# Patient Record
Sex: MALE | Race: WHITE | NOT HISPANIC OR LATINO | Employment: FULL TIME | URBAN - METROPOLITAN AREA
[De-identification: names, ages, dates, MRNs, and addresses within clinical notes are randomized per-mention and may not be internally consistent; named-entity substitution may affect disease eponyms.]

---

## 2023-12-05 PROBLEM — I82.409 DVT (DEEP VENOUS THROMBOSIS) (MULTI): Status: ACTIVE | Noted: 2023-12-05

## 2023-12-05 PROBLEM — E66.9 OBESITY: Status: ACTIVE | Noted: 2023-12-05

## 2023-12-05 PROBLEM — K43.0 HERNIA, INCISIONAL, WITH OBSTRUCTION: Status: ACTIVE | Noted: 2023-12-05

## 2023-12-05 PROBLEM — I26.99 PULMONARY EMBOLISM (MULTI): Status: ACTIVE | Noted: 2023-12-05

## 2023-12-05 PROBLEM — K42.0 INCARCERATED UMBILICAL HERNIA: Status: ACTIVE | Noted: 2023-12-05

## 2023-12-06 ENCOUNTER — OFFICE VISIT (OUTPATIENT)
Dept: CARDIOLOGY | Facility: CLINIC | Age: 63
End: 2023-12-06
Payer: COMMERCIAL

## 2023-12-06 VITALS
HEIGHT: 74 IN | WEIGHT: 245 LBS | SYSTOLIC BLOOD PRESSURE: 134 MMHG | DIASTOLIC BLOOD PRESSURE: 80 MMHG | BODY MASS INDEX: 31.44 KG/M2 | HEART RATE: 64 BPM | OXYGEN SATURATION: 95 %

## 2023-12-06 DIAGNOSIS — I27.82 CHRONIC SADDLE PULMONARY EMBOLISM WITHOUT ACUTE COR PULMONALE (MULTI): Primary | ICD-10-CM

## 2023-12-06 DIAGNOSIS — I26.92 CHRONIC SADDLE PULMONARY EMBOLISM WITHOUT ACUTE COR PULMONALE (MULTI): Primary | ICD-10-CM

## 2023-12-06 DIAGNOSIS — I82.532 CHRONIC DEEP VEIN THROMBOSIS (DVT) OF POPLITEAL VEIN OF LEFT LOWER EXTREMITY (MULTI): ICD-10-CM

## 2023-12-06 PROCEDURE — 99212 OFFICE O/P EST SF 10 MIN: CPT | Performed by: NURSE PRACTITIONER

## 2023-12-06 RX ORDER — APIXABAN 5 MG/1
5 TABLET, FILM COATED ORAL 2 TIMES DAILY
COMMUNITY
Start: 2023-11-13

## 2023-12-06 NOTE — PROGRESS NOTES
Jaime Johnson is a 63 y.o. male here for a follow up of a PE and DVT.     History Of Present Illness   Mr Johnson is a very pleasant 63 year old obese male who underwent a robotic assisted lab repair of hernia on November 21, 2022. On or around November 27, the patient developed severe BILATERAL leg edema, chest pain and shortness of breath. On November 20, 2022, the Patient was unable to do any form of activity without becoming very short of breath and having chest pain. He drove himself to ED and was found to have a large bilateral PE and an extensive left leg DVT. He was discharged on elqiuis one year ago.  Today, he denies any complaints of chest pain, shortness of breath or leg pain.  He does have edema to his left ankle and the top of the foot by the end of his day.  He wears his compression stockings every day.       Social HX          Family HX  No family history on file.       Review Of Systems   Constitutional: not feeling tired.   Eyes: no eyesight problems.   ENT: no hearing loss and no nosebleeds.   Cardiovascular: no intermittent leg claudication, no chest pain, no tightness or heavy pressure, no shortness of breath, no palpitations, no lower extremity edema, the heart rate was not slow, the heart rate was not fast and as noted in HPI.   Respiratory: no chronic cough and no shortness of breath.   Gastrointestinal: no change in bowel habits and no blood in stools.   Genitourinary: no urinary frequency.   Skin: no skin rashes.   Neurological: no seizures and no frequent falls.   Psychiatric: no depression and not suicidal.   All other systems have been reviewed and are negative for complaint.        Allergies  No Known Allergies       Vitals  There were no vitals taken for this visit.        Physical Exam  Constitutional: alert and in no acute distress.   Eyes: no erythema, swelling or discharge from the eye .   Neck: neck is supple, symmetric, trachea midline, no masses  and no thyromegaly .    Pulmonary: no increased work of breathing or signs of respiratory distress  and lungs clear to auscultation.  Auscultation of the lungs revealed no expiratory wheezing, normal expiratory time and no inspiratory wheezing. no rales or crackles were heard bilaterally. no rhonchi. no friction rub. no wheezing. no diminished breath sounds. no bronchial breath sounds.   Cardiovascular: carotid pulses 2+ bilaterally with no bruit  right carotid pulse 2+, no bruit heard over the right carotid,   Left carotid pulse 2+ and no bruit heard over the left carotid,   JVP was normal, no thrills ,   Regular rhythm, normal S1 and S2, no murmurs  the heart rate was normal normal S1, normal S2, no S3, no S4 no murmurs were heard.,   pedal pulses 2+ bilaterally  and no edema .   Abdomen: abdomen non-tender, no masses  and no hepatomegaly .   Skin: skin warm and dry, normal skin turgor .   Psychiatric judgment and insight is normal  and oriented to person, place and time .           Current/Home Meds  No current outpatient medications on file.       Cardiac Service Results:  5/25/23 venous duplex scan; No evidence of acute deep vein thrombus visualized in the left lower extremity. There are chronic changes visualized in the popliteal vein.     3/13/23 VENOUS DUPLEX SCAN No evidence of acute deep vein thrombus visualized in the left lower extremity. There are chronic changes visualized in the popliteal vein.     11/30/22: VENOUS DUPLEX SCAN Left Lower Venous: There is acute occlusive deep vein thrombosis visualized in the popliteal, posterior tibial and peroneal veins. There is acute non-occlusive deep vein thrombosis visualized in the distal femoral vein. Remainder of visualized vessels free of thrombus.  Additional Findings:  Evidence of Rouleaux effect (slow flow) on proximal vessels iliac and common  femoral veins - may suggest overall low pressure venous return systemically;  please correlate clinically.    ECHO  1. Left  ventricular systolic function is normal with a 60-65% estimated ejection fraction.  2. Saline contrast bubble study is negative for right to left shunting.  3. Definity study confirms normal left ventricular function.  4. Technically difficult study.  5. Pulmonary artery pressures could not be estimated. There is no significant tircuspid regurgitation.    CT SCAN 1. Large pulmonary emboli in the main pulmonary artery extendinginto  the right and left pulmonary arteries as well as in the lobar arteries  to the bilateral upper and lower lobes. There is no evidence for right  heart strain.  2. Small right pleural effusion with associated atelectasis.  3. No acute pathology in the abdomen or pelvis. There is a gallstone,  but there is no evidence for cholecystitis or biliary obstruction.  4. There is a defect in the midline anterior abdominal wall. This  appears to have been repaired with mesh. The defect contains fluid and  fat, but no loops of bowel.    Assessment/Plan      BILATERAL PE AND LEFT LEG DVT:  He has completed one year of ac.  He may stop when this current prescription runs out.  He is to continue to wear compression stockings and elevate his legs two-three times a day.  He can follow up PRN or if he has any questions, concerns or complaints.

## 2025-01-28 ENCOUNTER — APPOINTMENT (OUTPATIENT)
Dept: CARDIOLOGY | Facility: HOSPITAL | Age: 65
End: 2025-01-28
Payer: COMMERCIAL

## 2025-01-28 ENCOUNTER — HOSPITAL ENCOUNTER (INPATIENT)
Facility: HOSPITAL | Age: 65
LOS: 3 days | Discharge: HOME | End: 2025-01-31
Attending: EMERGENCY MEDICINE | Admitting: INTERNAL MEDICINE
Payer: COMMERCIAL

## 2025-01-28 ENCOUNTER — APPOINTMENT (OUTPATIENT)
Dept: RADIOLOGY | Facility: HOSPITAL | Age: 65
End: 2025-01-28
Payer: COMMERCIAL

## 2025-01-28 DIAGNOSIS — M79.89 OTHER SPECIFIED SOFT TISSUE DISORDERS: ICD-10-CM

## 2025-01-28 DIAGNOSIS — I26.92 CHRONIC SADDLE PULMONARY EMBOLISM WITHOUT ACUTE COR PULMONALE (MULTI): ICD-10-CM

## 2025-01-28 DIAGNOSIS — I26.02 ACUTE SADDLE PULMONARY EMBOLISM WITH ACUTE COR PULMONALE (MULTI): Primary | ICD-10-CM

## 2025-01-28 DIAGNOSIS — I26.99 OTHER PULMONARY EMBOLISM WITHOUT ACUTE COR PULMONALE: ICD-10-CM

## 2025-01-28 DIAGNOSIS — I27.82 CHRONIC SADDLE PULMONARY EMBOLISM WITHOUT ACUTE COR PULMONALE (MULTI): ICD-10-CM

## 2025-01-28 DIAGNOSIS — Z86.711 PERSONAL HISTORY OF PULMONARY EMBOLISM: ICD-10-CM

## 2025-01-28 LAB
ALBUMIN SERPL BCP-MCNC: 4.3 G/DL (ref 3.4–5)
ALP SERPL-CCNC: 95 U/L (ref 33–136)
ALT SERPL W P-5'-P-CCNC: 17 U/L (ref 10–52)
ANION GAP SERPL CALC-SCNC: 15 MMOL/L (ref 10–20)
APTT PPP: 29 SECONDS (ref 27–38)
AST SERPL W P-5'-P-CCNC: 17 U/L (ref 9–39)
BASOPHILS # BLD AUTO: 0.01 X10*3/UL (ref 0–0.1)
BASOPHILS NFR BLD AUTO: 0.1 %
BILIRUB SERPL-MCNC: 0.7 MG/DL (ref 0–1.2)
BNP SERPL-MCNC: 41 PG/ML (ref 0–99)
BUN SERPL-MCNC: 16 MG/DL (ref 6–23)
CALCIUM SERPL-MCNC: 9.1 MG/DL (ref 8.6–10.3)
CARDIAC TROPONIN I PNL SERPL HS: 1048 NG/L (ref 0–20)
CARDIAC TROPONIN I PNL SERPL HS: 569 NG/L (ref 0–20)
CARDIAC TROPONIN I PNL SERPL HS: 654 NG/L (ref 0–20)
CHLORIDE SERPL-SCNC: 105 MMOL/L (ref 98–107)
CO2 SERPL-SCNC: 24 MMOL/L (ref 21–32)
CREAT SERPL-MCNC: 1.15 MG/DL (ref 0.5–1.3)
EGFRCR SERPLBLD CKD-EPI 2021: 71 ML/MIN/1.73M*2
EOSINOPHIL # BLD AUTO: 0.04 X10*3/UL (ref 0–0.7)
EOSINOPHIL NFR BLD AUTO: 0.4 %
ERYTHROCYTE [DISTWIDTH] IN BLOOD BY AUTOMATED COUNT: 12.5 % (ref 11.5–14.5)
FLUAV RNA RESP QL NAA+PROBE: NOT DETECTED
FLUBV RNA RESP QL NAA+PROBE: NOT DETECTED
GLUCOSE SERPL-MCNC: 126 MG/DL (ref 74–99)
HCT VFR BLD AUTO: 49.6 % (ref 41–52)
HGB BLD-MCNC: 16.5 G/DL (ref 13.5–17.5)
IMM GRANULOCYTES # BLD AUTO: 0.04 X10*3/UL (ref 0–0.7)
IMM GRANULOCYTES NFR BLD AUTO: 0.4 % (ref 0–0.9)
INR PPP: 1.2 (ref 0.9–1.1)
LACTATE SERPL-SCNC: 1.4 MMOL/L (ref 0.4–2)
LYMPHOCYTES # BLD AUTO: 0.67 X10*3/UL (ref 1.2–4.8)
LYMPHOCYTES NFR BLD AUTO: 6.7 %
MAGNESIUM SERPL-MCNC: 1.93 MG/DL (ref 1.6–2.4)
MCH RBC QN AUTO: 31.9 PG (ref 26–34)
MCHC RBC AUTO-ENTMCNC: 33.3 G/DL (ref 32–36)
MCV RBC AUTO: 96 FL (ref 80–100)
MONOCYTES # BLD AUTO: 0.88 X10*3/UL (ref 0.1–1)
MONOCYTES NFR BLD AUTO: 8.7 %
NEUTROPHILS # BLD AUTO: 8.42 X10*3/UL (ref 1.2–7.7)
NEUTROPHILS NFR BLD AUTO: 83.7 %
NRBC BLD-RTO: 0 /100 WBCS (ref 0–0)
PLATELET # BLD AUTO: 188 X10*3/UL (ref 150–450)
POTASSIUM SERPL-SCNC: 3.7 MMOL/L (ref 3.5–5.3)
PROT SERPL-MCNC: 7.4 G/DL (ref 6.4–8.2)
PROTHROMBIN TIME: 13.3 SECONDS (ref 9.8–12.8)
RBC # BLD AUTO: 5.17 X10*6/UL (ref 4.5–5.9)
SARS-COV-2 RNA RESP QL NAA+PROBE: NOT DETECTED
SODIUM SERPL-SCNC: 140 MMOL/L (ref 136–145)
UFH PPP CHRO-ACNC: 0.4 IU/ML
UFH PPP CHRO-ACNC: 1.6 IU/ML
WBC # BLD AUTO: 10.1 X10*3/UL (ref 4.4–11.3)

## 2025-01-28 PROCEDURE — 85520 HEPARIN ASSAY: CPT | Performed by: EMERGENCY MEDICINE

## 2025-01-28 PROCEDURE — 84484 ASSAY OF TROPONIN QUANT: CPT

## 2025-01-28 PROCEDURE — 85730 THROMBOPLASTIN TIME PARTIAL: CPT | Performed by: EMERGENCY MEDICINE

## 2025-01-28 PROCEDURE — 93005 ELECTROCARDIOGRAM TRACING: CPT

## 2025-01-28 PROCEDURE — 36415 COLL VENOUS BLD VENIPUNCTURE: CPT | Performed by: EMERGENCY MEDICINE

## 2025-01-28 PROCEDURE — 85025 COMPLETE CBC W/AUTO DIFF WBC: CPT

## 2025-01-28 PROCEDURE — 71275 CT ANGIOGRAPHY CHEST: CPT

## 2025-01-28 PROCEDURE — 2020000001 HC ICU ROOM DAILY

## 2025-01-28 PROCEDURE — 85610 PROTHROMBIN TIME: CPT | Performed by: EMERGENCY MEDICINE

## 2025-01-28 PROCEDURE — 2550000001 HC RX 255 CONTRASTS: Performed by: EMERGENCY MEDICINE

## 2025-01-28 PROCEDURE — 83880 ASSAY OF NATRIURETIC PEPTIDE: CPT

## 2025-01-28 PROCEDURE — 83605 ASSAY OF LACTIC ACID: CPT

## 2025-01-28 PROCEDURE — 83735 ASSAY OF MAGNESIUM: CPT

## 2025-01-28 PROCEDURE — 99285 EMERGENCY DEPT VISIT HI MDM: CPT | Mod: 25 | Performed by: EMERGENCY MEDICINE

## 2025-01-28 PROCEDURE — 71275 CT ANGIOGRAPHY CHEST: CPT | Performed by: STUDENT IN AN ORGANIZED HEALTH CARE EDUCATION/TRAINING PROGRAM

## 2025-01-28 PROCEDURE — 2500000004 HC RX 250 GENERAL PHARMACY W/ HCPCS (ALT 636 FOR OP/ED)

## 2025-01-28 PROCEDURE — 99222 1ST HOSP IP/OBS MODERATE 55: CPT

## 2025-01-28 PROCEDURE — 2500000001 HC RX 250 WO HCPCS SELF ADMINISTERED DRUGS (ALT 637 FOR MEDICARE OP)

## 2025-01-28 PROCEDURE — 36415 COLL VENOUS BLD VENIPUNCTURE: CPT

## 2025-01-28 PROCEDURE — 85520 HEPARIN ASSAY: CPT | Performed by: INTERNAL MEDICINE

## 2025-01-28 PROCEDURE — 80053 COMPREHEN METABOLIC PANEL: CPT

## 2025-01-28 PROCEDURE — 96374 THER/PROPH/DIAG INJ IV PUSH: CPT

## 2025-01-28 PROCEDURE — 87636 SARSCOV2 & INF A&B AMP PRB: CPT

## 2025-01-28 PROCEDURE — 2500000002 HC RX 250 W HCPCS SELF ADMINISTERED DRUGS (ALT 637 FOR MEDICARE OP, ALT 636 FOR OP/ED)

## 2025-01-28 RX ORDER — FLUTICASONE PROPIONATE 50 MCG
1 SPRAY, SUSPENSION (ML) NASAL 2 TIMES DAILY
COMMUNITY

## 2025-01-28 RX ORDER — HEPARIN SODIUM 10000 [USP'U]/100ML
0-4500 INJECTION, SOLUTION INTRAVENOUS CONTINUOUS
Status: DISCONTINUED | OUTPATIENT
Start: 2025-01-28 | End: 2025-01-28

## 2025-01-28 RX ORDER — MORPHINE SULFATE 4 MG/ML
4 INJECTION, SOLUTION INTRAMUSCULAR; INTRAVENOUS ONCE
Status: DISCONTINUED | OUTPATIENT
Start: 2025-01-28 | End: 2025-01-28

## 2025-01-28 RX ORDER — GUAIFENESIN 100 MG/5ML
200 SOLUTION ORAL ONCE
Status: COMPLETED | OUTPATIENT
Start: 2025-01-28 | End: 2025-01-28

## 2025-01-28 RX ORDER — GUAIFENESIN 600 MG/1
600-1200 TABLET, EXTENDED RELEASE ORAL 2 TIMES DAILY PRN
COMMUNITY
Start: 2025-01-25 | End: 2025-01-31 | Stop reason: HOSPADM

## 2025-01-28 RX ORDER — FLUTICASONE PROPIONATE 50 MCG
1 SPRAY, SUSPENSION (ML) NASAL 2 TIMES DAILY
Status: DISCONTINUED | OUTPATIENT
Start: 2025-01-28 | End: 2025-01-31 | Stop reason: HOSPADM

## 2025-01-28 RX ORDER — GUAIFENESIN 600 MG/1
600-1200 TABLET, EXTENDED RELEASE ORAL 2 TIMES DAILY PRN
Status: CANCELLED | OUTPATIENT
Start: 2025-01-28

## 2025-01-28 RX ORDER — GUAIFENESIN 600 MG/1
600 TABLET, EXTENDED RELEASE ORAL 2 TIMES DAILY PRN
Status: DISCONTINUED | OUTPATIENT
Start: 2025-01-28 | End: 2025-01-31 | Stop reason: HOSPADM

## 2025-01-28 RX ORDER — CETIRIZINE HYDROCHLORIDE 10 MG/1
10 TABLET ORAL EVERY MORNING
COMMUNITY
End: 2025-01-31 | Stop reason: HOSPADM

## 2025-01-28 RX ORDER — ACETAMINOPHEN 325 MG/1
650 TABLET ORAL EVERY 4 HOURS PRN
Status: DISCONTINUED | OUTPATIENT
Start: 2025-01-28 | End: 2025-01-31 | Stop reason: HOSPADM

## 2025-01-28 RX ORDER — LEVALBUTEROL TARTRATE 45 UG/1
2 AEROSOL, METERED ORAL EVERY 4 HOURS PRN
COMMUNITY
Start: 2025-01-25 | End: 2025-02-24

## 2025-01-28 RX ORDER — HEPARIN SODIUM 10000 [USP'U]/100ML
0-4500 INJECTION, SOLUTION INTRAVENOUS CONTINUOUS
Status: DISCONTINUED | OUTPATIENT
Start: 2025-01-28 | End: 2025-01-30

## 2025-01-28 RX ORDER — HEPARIN SODIUM 10000 [USP'U]/100ML
0-4000 INJECTION, SOLUTION INTRAVENOUS CONTINUOUS
Status: DISCONTINUED | OUTPATIENT
Start: 2025-01-28 | End: 2025-01-28

## 2025-01-28 RX ORDER — AMOXICILLIN AND CLAVULANATE POTASSIUM 875; 125 MG/1; MG/1
1 TABLET, FILM COATED ORAL 2 TIMES DAILY
COMMUNITY
Start: 2025-01-25 | End: 2025-01-31 | Stop reason: HOSPADM

## 2025-01-28 RX ADMIN — IOHEXOL 75 ML: 350 INJECTION, SOLUTION INTRAVENOUS at 12:18

## 2025-01-28 RX ADMIN — HEPARIN SODIUM 2000 UNITS/HR: 10000 INJECTION, SOLUTION INTRAVENOUS at 12:35

## 2025-01-28 RX ADMIN — ACETAMINOPHEN 650 MG: 325 TABLET, FILM COATED ORAL at 20:38

## 2025-01-28 RX ADMIN — FLUTICASONE PROPIONATE 1 SPRAY: 50 SPRAY, METERED NASAL at 20:31

## 2025-01-28 RX ADMIN — GUAIFENESIN 600 MG: 600 TABLET, EXTENDED RELEASE ORAL at 20:38

## 2025-01-28 RX ADMIN — GUAIFENESIN 200 MG: 100 LIQUID ORAL at 12:18

## 2025-01-28 SDOH — SOCIAL STABILITY: SOCIAL INSECURITY: WITHIN THE LAST YEAR, HAVE YOU BEEN AFRAID OF YOUR PARTNER OR EX-PARTNER?: NO

## 2025-01-28 SDOH — SOCIAL STABILITY: SOCIAL INSECURITY: HAS ANYONE EVER THREATENED TO HURT YOUR FAMILY OR YOUR PETS?: NO

## 2025-01-28 SDOH — ECONOMIC STABILITY: INCOME INSECURITY: IN THE PAST 12 MONTHS HAS THE ELECTRIC, GAS, OIL, OR WATER COMPANY THREATENED TO SHUT OFF SERVICES IN YOUR HOME?: NO

## 2025-01-28 SDOH — ECONOMIC STABILITY: FOOD INSECURITY: HOW HARD IS IT FOR YOU TO PAY FOR THE VERY BASICS LIKE FOOD, HOUSING, MEDICAL CARE, AND HEATING?: NOT VERY HARD

## 2025-01-28 SDOH — SOCIAL STABILITY: SOCIAL INSECURITY: HAVE YOU HAD ANY THOUGHTS OF HARMING ANYONE ELSE?: NO

## 2025-01-28 SDOH — ECONOMIC STABILITY: HOUSING INSECURITY: AT ANY TIME IN THE PAST 12 MONTHS, WERE YOU HOMELESS OR LIVING IN A SHELTER (INCLUDING NOW)?: NO

## 2025-01-28 SDOH — ECONOMIC STABILITY: TRANSPORTATION INSECURITY: IN THE PAST 12 MONTHS, HAS LACK OF TRANSPORTATION KEPT YOU FROM MEDICAL APPOINTMENTS OR FROM GETTING MEDICATIONS?: NO

## 2025-01-28 SDOH — SOCIAL STABILITY: SOCIAL INSECURITY: WITHIN THE LAST YEAR, HAVE YOU BEEN HUMILIATED OR EMOTIONALLY ABUSED IN OTHER WAYS BY YOUR PARTNER OR EX-PARTNER?: NO

## 2025-01-28 SDOH — ECONOMIC STABILITY: HOUSING INSECURITY: IN THE PAST 12 MONTHS, HOW MANY TIMES HAVE YOU MOVED WHERE YOU WERE LIVING?: 1

## 2025-01-28 SDOH — ECONOMIC STABILITY: FOOD INSECURITY: WITHIN THE PAST 12 MONTHS, THE FOOD YOU BOUGHT JUST DIDN'T LAST AND YOU DIDN'T HAVE MONEY TO GET MORE.: NEVER TRUE

## 2025-01-28 SDOH — ECONOMIC STABILITY: HOUSING INSECURITY: IN THE LAST 12 MONTHS, WAS THERE A TIME WHEN YOU WERE NOT ABLE TO PAY THE MORTGAGE OR RENT ON TIME?: NO

## 2025-01-28 SDOH — SOCIAL STABILITY: SOCIAL INSECURITY: ABUSE: ADULT

## 2025-01-28 SDOH — SOCIAL STABILITY: SOCIAL INSECURITY
WITHIN THE LAST YEAR, HAVE YOU BEEN KICKED, HIT, SLAPPED, OR OTHERWISE PHYSICALLY HURT BY YOUR PARTNER OR EX-PARTNER?: NO

## 2025-01-28 SDOH — SOCIAL STABILITY: SOCIAL INSECURITY: ARE YOU OR HAVE YOU BEEN THREATENED OR ABUSED PHYSICALLY, EMOTIONALLY, OR SEXUALLY BY ANYONE?: NO

## 2025-01-28 SDOH — SOCIAL STABILITY: SOCIAL INSECURITY: WERE YOU ABLE TO COMPLETE ALL THE BEHAVIORAL HEALTH SCREENINGS?: YES

## 2025-01-28 SDOH — SOCIAL STABILITY: SOCIAL INSECURITY: DOES ANYONE TRY TO KEEP YOU FROM HAVING/CONTACTING OTHER FRIENDS OR DOING THINGS OUTSIDE YOUR HOME?: NO

## 2025-01-28 SDOH — SOCIAL STABILITY: SOCIAL INSECURITY: ARE THERE ANY APPARENT SIGNS OF INJURIES/BEHAVIORS THAT COULD BE RELATED TO ABUSE/NEGLECT?: NO

## 2025-01-28 SDOH — SOCIAL STABILITY: SOCIAL INSECURITY: HAVE YOU HAD THOUGHTS OF HARMING ANYONE ELSE?: NO

## 2025-01-28 SDOH — SOCIAL STABILITY: SOCIAL INSECURITY: DO YOU FEEL ANYONE HAS EXPLOITED OR TAKEN ADVANTAGE OF YOU FINANCIALLY OR OF YOUR PERSONAL PROPERTY?: NO

## 2025-01-28 SDOH — SOCIAL STABILITY: SOCIAL INSECURITY
WITHIN THE LAST YEAR, HAVE YOU BEEN RAPED OR FORCED TO HAVE ANY KIND OF SEXUAL ACTIVITY BY YOUR PARTNER OR EX-PARTNER?: NO

## 2025-01-28 SDOH — ECONOMIC STABILITY: FOOD INSECURITY: WITHIN THE PAST 12 MONTHS, YOU WORRIED THAT YOUR FOOD WOULD RUN OUT BEFORE YOU GOT THE MONEY TO BUY MORE.: NEVER TRUE

## 2025-01-28 SDOH — SOCIAL STABILITY: SOCIAL INSECURITY: DO YOU FEEL UNSAFE GOING BACK TO THE PLACE WHERE YOU ARE LIVING?: NO

## 2025-01-28 ASSESSMENT — ACTIVITIES OF DAILY LIVING (ADL)
LACK_OF_TRANSPORTATION: NO
FEEDING YOURSELF: INDEPENDENT
TOILETING: INDEPENDENT
LACK_OF_TRANSPORTATION: NO
WALKS IN HOME: INDEPENDENT
JUDGMENT_ADEQUATE_SAFELY_COMPLETE_DAILY_ACTIVITIES: YES
PATIENT'S MEMORY ADEQUATE TO SAFELY COMPLETE DAILY ACTIVITIES?: YES
HEARING - RIGHT EAR: FUNCTIONAL
BATHING: INDEPENDENT
DRESSING YOURSELF: INDEPENDENT
GROOMING: INDEPENDENT
HEARING - LEFT EAR: FUNCTIONAL
ADEQUATE_TO_COMPLETE_ADL: YES

## 2025-01-28 ASSESSMENT — COGNITIVE AND FUNCTIONAL STATUS - GENERAL
DAILY ACTIVITIY SCORE: 24
PATIENT BASELINE BEDBOUND: NO
CLIMB 3 TO 5 STEPS WITH RAILING: A LITTLE
MOBILITY SCORE: 23

## 2025-01-28 ASSESSMENT — LIFESTYLE VARIABLES
SKIP TO QUESTIONS 9-10: 1
AUDIT-C TOTAL SCORE: 0
PRESCIPTION_ABUSE_PAST_12_MONTHS: NO
HOW OFTEN DO YOU HAVE 6 OR MORE DRINKS ON ONE OCCASION: NEVER
HOW OFTEN DO YOU HAVE A DRINK CONTAINING ALCOHOL: NEVER
HOW MANY STANDARD DRINKS CONTAINING ALCOHOL DO YOU HAVE ON A TYPICAL DAY: PATIENT DOES NOT DRINK
AUDIT-C TOTAL SCORE: 0
SUBSTANCE_ABUSE_PAST_12_MONTHS: NO

## 2025-01-28 ASSESSMENT — PATIENT HEALTH QUESTIONNAIRE - PHQ9
SUM OF ALL RESPONSES TO PHQ9 QUESTIONS 1 & 2: 0
1. LITTLE INTEREST OR PLEASURE IN DOING THINGS: NOT AT ALL
2. FEELING DOWN, DEPRESSED OR HOPELESS: NOT AT ALL

## 2025-01-28 ASSESSMENT — PAIN DESCRIPTION - PAIN TYPE
TYPE: ACUTE PAIN
TYPE: ACUTE PAIN

## 2025-01-28 ASSESSMENT — PAIN DESCRIPTION - DESCRIPTORS
DESCRIPTORS: ACHING
DESCRIPTORS: OTHER (COMMENT)
DESCRIPTORS: ACHING

## 2025-01-28 ASSESSMENT — ENCOUNTER SYMPTOMS
GASTROINTESTINAL NEGATIVE: 1
NEUROLOGICAL NEGATIVE: 1
EYES NEGATIVE: 1
ACTIVITY CHANGE: 1
APPETITE CHANGE: 1
SINUS PRESSURE: 1
CHEST TIGHTNESS: 1
SHORTNESS OF BREATH: 1
MUSCULOSKELETAL NEGATIVE: 1

## 2025-01-28 ASSESSMENT — COLUMBIA-SUICIDE SEVERITY RATING SCALE - C-SSRS

## 2025-01-28 ASSESSMENT — PAIN SCALES - GENERAL
PAINLEVEL_OUTOF10: 3
PAINLEVEL_OUTOF10: 0 - NO PAIN
PAINLEVEL_OUTOF10: 8
PAINLEVEL_OUTOF10: 7

## 2025-01-28 ASSESSMENT — PAIN - FUNCTIONAL ASSESSMENT
PAIN_FUNCTIONAL_ASSESSMENT: 0-10

## 2025-01-28 ASSESSMENT — PAIN DESCRIPTION - LOCATION
LOCATION: CHEST

## 2025-01-28 ASSESSMENT — PAIN DESCRIPTION - ORIENTATION: ORIENTATION: LEFT

## 2025-01-28 NOTE — SIGNIFICANT EVENT
"PULMONARY EMBOLISM RESPONSE TEAM (PERT) NOTE    This note represents a summary of a virtual evaluation by the pulmonary embolism response team.  Suggestions and impression are summarized from the initial virtual encounter and discussion with the referring medical team. These suggestions supplement but should not be a substitute for bedside evaluation and management by the attending of record.     PERT Members on the Call:  Critical Care Attending: Dr. Christ ELI Interventional Cardiology Attending: Dr. James  Vascular Medicine Attending: Dr. Lyon    Brief Clinical Summary:  Jaime Johnson is a 64 y.o. male presenting with PE. He has a past history notable for previous DVT/PE on eliquis, which was stopped many months ago (6 months to 1 year). The PE was noted in 12/2022 along with DVT of left lower extremity, thought to be in the setting of sedentary lifestyle in the post operative period (after a hip surgery). At the time of this presentation, no significant factors that would qualify as risk factors for clot at this time.    Patient reported four days of shortness of breath, describing dyspnea and chest tightness with exertion. He did have some mild cough but infectious work up negative. He was saturating at about 90% on RA; was placed on 1-2LNC due mostly to comfort.     He underwent CT imaging for PE evaluation that showed bilateral segmental embolic burden. RV:LV ratio greater than 1. Formal TTE and DVT ultrasound pending.     Vital Signs  /81   Pulse (!) 123   Temp 36 °C (96.8 °F) (Tympanic)   Resp 20   Ht 1.88 m (6' 2\")   Wt 109 kg (240 lb)   SpO2 (!) 90%   BMI 30.81 kg/m²      Laboratory  Recent Labs     01/28/25  1110 12/15/22  1232 11/30/22  1614 11/30/22  1200   TROPHS 569*  654* 12 329* 389*   BNP 41  --   --  126*   LACTATE  --   --   --  1.4       PESI Score Al HUTCHISON. Et al. Arch Intern Med. [id number].           PESI Score:  94, consistent with JLPESIRISK: Class III, or " Intermediate risk (3.2-7.1% 30-day mortality risk) PE.    CT Scan reviewed:  Clot burden bilaterally in segmental regions   RV/LV ratio >1 by CT scan    TTE reviewed (if available):  pending    Venous duplex (if available):  pending    Contraindications to anticoagulation: none apparent  Contraindications to thrombolytics: none apparent    Initial Suggestions/Plans:  Stable enough for admission to current facility ([facility])  Agree with maintaining on medical therapy with continuation of heparin drip  Additional testing recommended: formal TTE, lactate, DVT ultrasounds  4. Though not indicated at this time, interventional options would be available at [facility] if needed in the future    To re conference the PERT team please call the  Transfer Center at [phone].

## 2025-01-28 NOTE — ED TRIAGE NOTES
Pt coming in for chest pain that has been going on for the last couple of days but got to be really severe this morning around 7 AM. States that he has been dealing with a upper respiratory infection and has been taking Augmentin. Pt is very short of breath in triage. States that after walking in from the parking lot he feels like he can't catch his breath.

## 2025-01-28 NOTE — PROGRESS NOTES
Pharmacy Medication History Review    Jaime Johnson is a 64 y.o. male admitted for No Principal Problem: There is no principal problem currently on the Problem List. Please update the Problem List and refresh.. Pharmacy reviewed the patient's [id number] medications and allergies for accuracy.    The list below reflectives the updated PTA list. Please review each medication in order reconciliation for additional clarification and justification.  Prior to Admission medications    Medication Sig Start Date End Date Taking? Authorizing Provider   amoxicillin-pot clavulanate (Augmentin) 875-125 mg tablet Take 1 tablet by mouth twice a day. 1/25/25 2/1/25 Yes Historical Provider, MD   cetirizine (ZyrTEC) 10 mg tablet Take 1 tablet (10 mg) by mouth once daily in the morning.   Yes Historical Provider, MD   fluticasone (Flonase) 50 mcg/actuation nasal spray Administer 1 spray into each nostril 2 times a day. Shake gently. Before first use, prime pump. After use, clean tip and replace cap.   Yes Historical Provider, MD   guaiFENesin (Mucinex) 600 mg 12 hr tablet Take 1-2 tablets (600-1,200 mg) by mouth 2 times a day as needed. 1/25/25 2/1/25 Yes Historical Provider, MD   levalbuterol (Xopenex) 45 mcg/actuation inhaler Inhale 2 puffs every 4 hours if needed for wheezing or shortness of breath. 1/25/25 2/24/25 Yes Historical Provider, MD   Eliquis 5 mg tablet Take 1 tablet (5 mg) by mouth 2 times a day.  Patient not taking: Reported on 1/28/2025 11/13/23  no Historical Provider, MD        The list below reflectives the updated allergy list. Please review each documented allergy for additional clarification and justification.  Allergies  Reviewed by Savi Hodgson on 1/28/2025   No Known Allergies         Below are additional concerns with the patient's PTA list.    Patient completed Eliquis one year therapy in  2023.    Savi Hodgson

## 2025-01-28 NOTE — ED PROVIDER NOTES
Emergency Department Provider Note          History of Present Illness     CC: Chest Pain and Shortness of Breath (Pt coming in for chest pain that has been going on for the last couple of days but got to be really severe this morning around 7 AM. States that he has been dealing with a upper respiratory infection and has been taking Augmentin. Pt is very short of breath in triage. States that after walking in from the parking lot he feels like he can't catch his breath. /)     History provided by: Patient  Limitations to History: None    HPI:   Jaime Johnson is a 64 y.o.male with PMH Obesity, DVT/PE on eliquis, recent pneumonia (on augmentin) presenting to the Emergency Department for chest pain and shortness of breath.  Accompanied today by his wife.  Patient reports the past 3 days now has had left-sided chest pain that he reports is sharp in nature and worse with deep inspiration.  Has had worsening shortness of breath for the past couple days as well.  Said this pain has been constant.  Worse with exertion.  Never had symptoms like this before in his life.  Does a history of DVT/PE in 2022-23 for which he was placed on Eliquis for a year.  Has been off anticoagulation for many months now.  Endorses cough, congestion, runny nose, and chills at home.  No abdominal pain, nausea, vomiting, diarrhea, or changes in urination/stool.    Records Reviewed: Recent available ED and inpatient notes reviewed in EMR.    PMHx/PSHx:  Per HPI.   - has a past medical history of Other specified health status.  - has a past surgical history that includes Small intestine surgery (10/29/2015) and Knee surgery (11/05/2015).  - has Incarcerated umbilical hernia; DVT (deep venous thrombosis) (Multi); Hernia, incisional, with obstruction; Obesity; and Pulmonary embolism on their problem list.    Medications:  Current Outpatient Medications   Medication Instructions    amoxicillin-pot clavulanate (Augmentin) 875-125 mg tablet 1 tablet,  oral, 2 times daily    cetirizine (ZYRTEC) 10 mg, Every morning    Eliquis 5 mg, 2 times daily    fluticasone (Flonase) 50 mcg/actuation nasal spray 1 spray, 2 times daily    guaiFENesin (MUCINEX) 600-1,200 mg, oral, 2 times daily PRN    levalbuterol (Xopenex) 45 mcg/actuation inhaler 2 puffs, inhalation, Every 4 hours PRN        Allergies:  Patient has no known allergies.    Social History:  - Tobacco:  has no history on file for tobacco use.   - Alcohol:  has no history on file for alcohol use.   - Illicit Drugs:  has no history on file for drug use.     ROS:  Per HPI.       Physical Exam     Triage Vitals:  T 36 °C (96.8 °F)  HR (!) 119  /89  RR 18  O2 (!) 93 % None (Room air)    General: Awake, alert, in no acute distress  Eyes: Gaze conjugate.  No scleral icterus or injection  HENT: Normo-cephalic, atraumatic. No stridor  CV: Tachycardic rate, regular rhythm. Radial pulses 2+ bilaterally  Resp: Tachypnic, increased work of breathing. Clear to auscultation.  GI: Soft, non-distended, non-tender. No rebound or guarding.  MSK/Extremities: No gross bony deformities. Moving all extremities  Skin: Warm. Appropriate color  Neuro: Alert. Oriented. Face symmetric. Speech is fluent.  Gross strength and sensation intact in b/l UE and LEs  Psych: Appropriate mood and affect          [place] Coma Scale Score: 15                    Medical Decision Making & ED Course     EKG: EKG interpreted by myself. Please see ED Course for full interpretation.    Medical Decision Making   Jaime Johnson is a 64 y.o.male presenting to the Emergency Department for shortness of breath.  On arrival, notably tachycardic to 123.  Saturating 90% on room air placed on 2 L nasal cannula.  On examination, patient appears tachypneic, short of breath.  No signs of fluid overload today.  EKG on arrival showed sinus tachycardia.  Last reported the emergency department showed a white count of 10.1, lower concern for systemic infectious process.   Hemoglobin stable at 16.5, lower concern for acute blood loss anemia.  Chemistries relatively unremarkable.  Troponin significantly elevated at 654 and downtrending at 569 consistent with heart strain.  BNP notably normal within normal limits.  CT PE showed evidence of significant clot burden concerning for saddle PE.  Point-of-care ultrasound performed that showed mild right-sided heart strain.  Discussed with the PERT team downtown who feels recommended medical management with heparin drip today and observation.  No acute thrombectomy at this time.  Will be admitted to the medical ICU for further evaluation/management.      ED Course as of 01/28/25 1403   Tue Jan 28, 2025   1212 EKG: Tachycardic to 119, regular rhythm.  DC, QRS, QTc intervals within normal limits.  No obvious ST elevations, depressions, T wave inversions.  Sinus tachycardia [AS]      ED Course User Index  [AS] Adrián Snyder MD       Independent Result Review and Interpretation: Relevant laboratory and radiographic results were reviewed and independently interpreted by myself.  As necessary, they are commented on in the ED Course.    Chronic conditions affecting the patient's care: As documented above in MDM.      Disposition   Admit    Adrián Snyder MD  Emergency Medicine PGY3      Procedures     Procedures ? Smart[employer] last updated 1/28/2025 2:03 PM        Adrián Snyder MD  Resident  01/28/25 1403

## 2025-01-28 NOTE — Clinical Note
Second ekos cath at desired location. Caps flushed and applied to device. Tagaderms applied over sutured sheath

## 2025-01-28 NOTE — H&P
ICU HPI Note          PATIENT NAME: Jaime Johnson  MRN: [id number]    SERVICE DATE:  1/28/2025  SERVICE TIME:  2:34 PM    Jaime Johnson is a 64 y.o. male on day 0 of admission presenting with Acute saddle pulmonary embolism with acute cor pulmonale (Multi).      HPI  Jaime Johnson is a 64-year-old male with past medical history of obesity, DVT/PE on Eliquis, recent sinusitis treated with Augmentin who presented for chest pain and shortness of breath.  Patient states for the past 5 days he has had left-sided chest pain.  He reports it is worsened with deep inspiration.  Patient also endorses worsening shortness of breath which is exacerbated by exertion.  Patient states he reported to [facility] clinic for sinus congestion, headache, and coughing.  He was diagnosed with sinusitis and prescribed Augmentin and an albuterol inhaler.  This morning, patient states he could not walk further than 20 feet without becoming short of breath. He also endorsed sharp chest pain at this time.  He and his wife contacted his cardiologist who recommended he present to the ED.  Patient states he had a DVT/PE in 2022 for which he was placed on Eliquis for a year.  He is not currently on any anticoagulation.    On arrival patient was afebrile, tachycardic 119, 148/89, 93% on room air.  2 L oxygen nasal cannula.  EKG showed sinus tachycardia.  CBC and CMP were unremarkable.  Labs elevated at 654, repeat 569.  CT PE showed evidence of significant clot burden concerning for saddle PE.  ED provider spoke with PERT team who recommended medical management with heparin drip and observation.  No acute thrombectomy is needed at this time.  He was started on a heparin drip and given robitussin. Patient was admitted to the ICU for further management.    Past Medical History: as listed in the HPI  Surgical History: hernia repair, knee replacement  Social History: Quit smoking 44 years  ago.    Review of Systems   Constitutional:  Positive for activity change and appetite change.   HENT:  Positive for congestion and sinus pressure.    Eyes: Negative.    Respiratory:  Positive for chest tightness and shortness of breath.    Cardiovascular:  Positive for chest pain.   Gastrointestinal: Negative.    Genitourinary: Negative.    Musculoskeletal: Negative.    Neurological: Negative.         OBJECTIVE    Vitals:    01/28/25 1115 01/28/25 1230 01/28/25 1330 01/28/25 1400   BP: 136/86 137/81 123/81 129/87   BP Location:       Patient Position:       Pulse: (!) 116 (!) 123 (!) 120 (!) 119   Resp: 20 20 20 (!) 23   Temp:       TempSrc:       SpO2: 94% (!) 90% 94% 94%   Weight:       Height:          Results from last 7 days   Lab Units 01/28/25  1110   WBC AUTO x10*3/uL 10.1   HEMOGLOBIN g/dL 16.5   HEMATOCRIT % 49.6   PLATELETS AUTO x10*3/uL 188   NEUTROS PCT AUTO % 83.7   LYMPHS PCT AUTO % 6.7   MONOS PCT AUTO % 8.7   EOS PCT AUTO % 0.4     Results from last 7 days   Lab Units 01/28/25  1110   SODIUM mmol/L 140   POTASSIUM mmol/L 3.7   CHLORIDE mmol/L 105   CO2 mmol/L 24   BUN mg/dL 16   CREATININE mg/dL 1.15   CALCIUM mg/dL 9.1   PROTEIN TOTAL g/dL 7.4   BILIRUBIN TOTAL mg/dL 0.7   ALK PHOS U/L 95   ALT U/L 17   AST U/L 17   GLUCOSE mg/dL 126*         Physical Exam  Constitutional:       Appearance: Normal appearance. He is obese.   HENT:      Mouth/Throat:      Mouth: Mucous membranes are dry.      Pharynx: Oropharynx is clear.   Eyes:      Extraocular Movements: Extraocular movements intact.   Cardiovascular:      Rate and Rhythm: Regular rhythm. Tachycardia present.   Pulmonary:      Effort: Pulmonary effort is normal.      Breath sounds: No wheezing, rhonchi or rales.   Abdominal:      General: Abdomen is flat.      Palpations: Abdomen is soft.      Tenderness: There is no abdominal tenderness. There is no guarding or rebound.   Musculoskeletal:      Right lower leg: No edema.      Left lower leg: No  edema.   Skin:     General: Skin is warm and dry.   Neurological:      General: No focal deficit present.      Mental Status: He is alert.            ASSESSMENT & PLAN  64-year-old male with past medical history of obesity, DVT/PE on Eliquis, recent sinusitis treated with Augmentin who presented for chest pain and shortness of breath.      Pt  presenting to [facility] with   Chief Complaint   Patient presents with    Chest Pain    Shortness of Breath     Pt coming in for chest pain that has been going on for the last couple of days but got to be really severe this morning around 7 AM. States that he has been dealing with a upper respiratory infection and has been taking Augmentin. Pt is very short of breath in triage. States that after walking in from the parking lot he feels like he can't catch his breath.        Neuro:  -No acute issues    CV:  #Troponinemia  #C/f right heart strain  -most likely secondary to demand  -trend troponin     Vascular:  #Acute PE  #Hx of DVT/PE  -evaluated by PERT team, recommend heparin drip and supportive treatment.   -heparin drip    Respiratory:   #Acute respiratory insufficiency secondary to PE  #Chest pain  -2L NC, wean as tolerated  -tylenol PRN    GI:  -No acute issues  -Regular diet    Endo:  -No acute issues    Renal:  -No acute issues    Hematological:  -No acute issues    ID:  #Sinusitis  -flonase  -mucinex PRN    Vent/O2:   2L NC  Lines/Devices: PIV  Drips: --  ATBx: --  Fluids: --  Diet: Regular  PPX: --  DVT PPX: heparin drip  Code status: Full code  Dispo: ICU    Tina Angulo DO PGY-1 Internal Medicine  January 28, 2025 2:34 PM

## 2025-01-29 ENCOUNTER — APPOINTMENT (OUTPATIENT)
Dept: RADIOLOGY | Facility: HOSPITAL | Age: 65
End: 2025-01-29
Payer: COMMERCIAL

## 2025-01-29 ENCOUNTER — APPOINTMENT (OUTPATIENT)
Dept: CARDIOLOGY | Facility: HOSPITAL | Age: 65
End: 2025-01-29
Payer: COMMERCIAL

## 2025-01-29 ENCOUNTER — APPOINTMENT (OUTPATIENT)
Dept: VASCULAR MEDICINE | Facility: HOSPITAL | Age: 65
End: 2025-01-29
Payer: COMMERCIAL

## 2025-01-29 LAB
ANION GAP SERPL CALC-SCNC: 15 MMOL/L (ref 10–20)
ANION GAP SERPL CALC-SCNC: 16 MMOL/L (ref 10–20)
APTT PPP: 37 SECONDS (ref 27–38)
APTT PPP: 56 SECONDS (ref 27–38)
ATRIAL RATE: 119 BPM
BUN SERPL-MCNC: 18 MG/DL (ref 6–23)
BUN SERPL-MCNC: 21 MG/DL (ref 6–23)
CALCIUM SERPL-MCNC: 8.7 MG/DL (ref 8.6–10.3)
CALCIUM SERPL-MCNC: 8.9 MG/DL (ref 8.6–10.3)
CARDIAC TROPONIN I PNL SERPL HS: 277 NG/L (ref 0–20)
CHLORIDE SERPL-SCNC: 105 MMOL/L (ref 98–107)
CHLORIDE SERPL-SCNC: 107 MMOL/L (ref 98–107)
CO2 SERPL-SCNC: 21 MMOL/L (ref 21–32)
CO2 SERPL-SCNC: 23 MMOL/L (ref 21–32)
CREAT SERPL-MCNC: 1.04 MG/DL (ref 0.5–1.3)
CREAT SERPL-MCNC: 1.07 MG/DL (ref 0.5–1.3)
EGFRCR SERPLBLD CKD-EPI 2021: 77 ML/MIN/1.73M*2
EGFRCR SERPLBLD CKD-EPI 2021: 80 ML/MIN/1.73M*2
EJECTION FRACTION APICAL 4 CHAMBER: 66.9
EJECTION FRACTION: 68 %
ERYTHROCYTE [DISTWIDTH] IN BLOOD BY AUTOMATED COUNT: 12.8 % (ref 11.5–14.5)
ERYTHROCYTE [DISTWIDTH] IN BLOOD BY AUTOMATED COUNT: 12.9 % (ref 11.5–14.5)
ERYTHROCYTE [DISTWIDTH] IN BLOOD BY AUTOMATED COUNT: 13 % (ref 11.5–14.5)
FIBRINOGEN PPP-MCNC: 581 MG/DL (ref 200–400)
FIBRINOGEN PPP-MCNC: 581 MG/DL (ref 200–400)
GLUCOSE SERPL-MCNC: 112 MG/DL (ref 74–99)
GLUCOSE SERPL-MCNC: 129 MG/DL (ref 74–99)
HCT VFR BLD AUTO: 46.9 % (ref 41–52)
HCT VFR BLD AUTO: 47.8 % (ref 41–52)
HCT VFR BLD AUTO: 48.8 % (ref 41–52)
HGB BLD-MCNC: 16 G/DL (ref 13.5–17.5)
HGB BLD-MCNC: 16.1 G/DL (ref 13.5–17.5)
HGB BLD-MCNC: 16.1 G/DL (ref 13.5–17.5)
INR PPP: 1.2 (ref 0.9–1.1)
INR PPP: 1.3 (ref 0.9–1.1)
MCH RBC QN AUTO: 31.6 PG (ref 26–34)
MCH RBC QN AUTO: 32.1 PG (ref 26–34)
MCH RBC QN AUTO: 32.2 PG (ref 26–34)
MCHC RBC AUTO-ENTMCNC: 33 G/DL (ref 32–36)
MCHC RBC AUTO-ENTMCNC: 33.7 G/DL (ref 32–36)
MCHC RBC AUTO-ENTMCNC: 34.1 G/DL (ref 32–36)
MCV RBC AUTO: 94 FL (ref 80–100)
MCV RBC AUTO: 95 FL (ref 80–100)
MCV RBC AUTO: 96 FL (ref 80–100)
NRBC BLD-RTO: 0 /100 WBCS (ref 0–0)
P AXIS: 59 DEGREES
P OFFSET: 218 MS
P ONSET: 151 MS
PLATELET # BLD AUTO: 165 X10*3/UL (ref 150–450)
PLATELET # BLD AUTO: 173 X10*3/UL (ref 150–450)
PLATELET # BLD AUTO: 184 X10*3/UL (ref 150–450)
POTASSIUM SERPL-SCNC: 3.7 MMOL/L (ref 3.5–5.3)
POTASSIUM SERPL-SCNC: 3.7 MMOL/L (ref 3.5–5.3)
PR INTERVAL: 140 MS
PROTHROMBIN TIME: 13.9 SECONDS (ref 9.8–12.8)
PROTHROMBIN TIME: 14.1 SECONDS (ref 9.8–12.8)
Q ONSET: 221 MS
QRS COUNT: 20 BEATS
QRS DURATION: 82 MS
QT INTERVAL: 312 MS
QTC CALCULATION(BAZETT): 438 MS
QTC FREDERICIA: 392 MS
R AXIS: 44 DEGREES
RBC # BLD AUTO: 4.97 X10*6/UL (ref 4.5–5.9)
RBC # BLD AUTO: 5.02 X10*6/UL (ref 4.5–5.9)
RBC # BLD AUTO: 5.09 X10*6/UL (ref 4.5–5.9)
SODIUM SERPL-SCNC: 139 MMOL/L (ref 136–145)
SODIUM SERPL-SCNC: 140 MMOL/L (ref 136–145)
T AXIS: 78 DEGREES
T OFFSET: 377 MS
UFH PPP CHRO-ACNC: 0.5 IU/ML
UFH PPP CHRO-ACNC: 0.7 IU/ML
VENTRICULAR RATE: 119 BPM
WBC # BLD AUTO: 10.4 X10*3/UL (ref 4.4–11.3)
WBC # BLD AUTO: 10.7 X10*3/UL (ref 4.4–11.3)
WBC # BLD AUTO: 9.8 X10*3/UL (ref 4.4–11.3)

## 2025-01-29 PROCEDURE — 37799 UNLISTED PX VASCULAR SURGERY: CPT

## 2025-01-29 PROCEDURE — 76937 US GUIDE VASCULAR ACCESS: CPT

## 2025-01-29 PROCEDURE — 2500000001 HC RX 250 WO HCPCS SELF ADMINISTERED DRUGS (ALT 637 FOR MEDICARE OP)

## 2025-01-29 PROCEDURE — 36415 COLL VENOUS BLD VENIPUNCTURE: CPT

## 2025-01-29 PROCEDURE — 85027 COMPLETE CBC AUTOMATED: CPT | Performed by: RADIOLOGY

## 2025-01-29 PROCEDURE — 77001 FLUOROGUIDE FOR VEIN DEVICE: CPT

## 2025-01-29 PROCEDURE — 02FP3Z0 FRAGMENTATION OF PULMONARY TRUNK, PERCUTANEOUS APPROACH, ULTRASONIC: ICD-10-PCS | Performed by: RADIOLOGY

## 2025-01-29 PROCEDURE — 2500000001 HC RX 250 WO HCPCS SELF ADMINISTERED DRUGS (ALT 637 FOR MEDICARE OP): Performed by: INTERNAL MEDICINE

## 2025-01-29 PROCEDURE — 85384 FIBRINOGEN ACTIVITY: CPT | Performed by: RADIOLOGY

## 2025-01-29 PROCEDURE — 99291 CRITICAL CARE FIRST HOUR: CPT

## 2025-01-29 PROCEDURE — 2500000004 HC RX 250 GENERAL PHARMACY W/ HCPCS (ALT 636 FOR OP/ED)

## 2025-01-29 PROCEDURE — 37799 UNLISTED PX VASCULAR SURGERY: CPT | Performed by: RADIOLOGY

## 2025-01-29 PROCEDURE — C8924 2D TTE W OR W/O FOL W/CON,FU: HCPCS

## 2025-01-29 PROCEDURE — 80048 BASIC METABOLIC PNL TOTAL CA: CPT

## 2025-01-29 PROCEDURE — 3E08317 INTRODUCTION OF OTHER THROMBOLYTIC INTO HEART, PERCUTANEOUS APPROACH: ICD-10-PCS | Performed by: RADIOLOGY

## 2025-01-29 PROCEDURE — 61623 EVASC TEMP BALO ARTL OCC H/N: CPT

## 2025-01-29 PROCEDURE — 85520 HEPARIN ASSAY: CPT | Performed by: INTERNAL MEDICINE

## 2025-01-29 PROCEDURE — 93308 TTE F-UP OR LMTD: CPT | Performed by: STUDENT IN AN ORGANIZED HEALTH CARE EDUCATION/TRAINING PROGRAM

## 2025-01-29 PROCEDURE — 80048 BASIC METABOLIC PNL TOTAL CA: CPT | Performed by: RADIOLOGY

## 2025-01-29 PROCEDURE — 84484 ASSAY OF TROPONIN QUANT: CPT

## 2025-01-29 PROCEDURE — 36415 COLL VENOUS BLD VENIPUNCTURE: CPT | Performed by: INTERNAL MEDICINE

## 2025-01-29 PROCEDURE — 2720000007 HC OR 272 NO HCPCS

## 2025-01-29 PROCEDURE — C1769 GUIDE WIRE: HCPCS

## 2025-01-29 PROCEDURE — 36216 PLACE CATHETER IN ARTERY: CPT

## 2025-01-29 PROCEDURE — 85610 PROTHROMBIN TIME: CPT | Performed by: RADIOLOGY

## 2025-01-29 PROCEDURE — 2020000001 HC ICU ROOM DAILY

## 2025-01-29 PROCEDURE — 2500000004 HC RX 250 GENERAL PHARMACY W/ HCPCS (ALT 636 FOR OP/ED): Performed by: RADIOLOGY

## 2025-01-29 PROCEDURE — 85027 COMPLETE CBC AUTOMATED: CPT

## 2025-01-29 PROCEDURE — C1757 CATH, THROMBECTOMY/EMBOLECT: HCPCS

## 2025-01-29 RX ORDER — SODIUM CHLORIDE 9 MG/ML
30 INJECTION, SOLUTION INTRAVENOUS CONTINUOUS
Status: ACTIVE | OUTPATIENT
Start: 2025-01-29 | End: 2025-01-30

## 2025-01-29 RX ORDER — ACETAMINOPHEN 500 MG
5 TABLET ORAL NIGHTLY PRN
Status: DISCONTINUED | OUTPATIENT
Start: 2025-01-29 | End: 2025-01-31 | Stop reason: HOSPADM

## 2025-01-29 RX ORDER — HEPARIN SODIUM 10000 [USP'U]/100ML
600 INJECTION, SOLUTION INTRAVENOUS CONTINUOUS
Status: DISCONTINUED | OUTPATIENT
Start: 2025-01-29 | End: 2025-01-30

## 2025-01-29 RX ORDER — SIMETHICONE 80 MG
40 TABLET,CHEWABLE ORAL 4 TIMES DAILY PRN
Status: DISCONTINUED | OUTPATIENT
Start: 2025-01-29 | End: 2025-01-31 | Stop reason: HOSPADM

## 2025-01-29 RX ORDER — LIDOCAINE HYDROCHLORIDE 10 MG/ML
INJECTION, SOLUTION EPIDURAL; INFILTRATION; INTRACAUDAL; PERINEURAL
Status: COMPLETED | OUTPATIENT
Start: 2025-01-29 | End: 2025-01-29

## 2025-01-29 RX ADMIN — SIMETHICONE 40 MG: 80 TABLET, CHEWABLE ORAL at 05:17

## 2025-01-29 RX ADMIN — ACETAMINOPHEN 650 MG: 325 TABLET, FILM COATED ORAL at 20:54

## 2025-01-29 RX ADMIN — SODIUM CHLORIDE 30 ML/HR: 9 INJECTION, SOLUTION INTRAVENOUS at 09:56

## 2025-01-29 RX ADMIN — HEPARIN SODIUM 1700 UNITS/HR: 10000 INJECTION, SOLUTION INTRAVENOUS at 02:37

## 2025-01-29 RX ADMIN — ALTEPLASE 1 MG/HR: 2.2 INJECTION, POWDER, LYOPHILIZED, FOR SOLUTION INTRAVENOUS at 09:57

## 2025-01-29 RX ADMIN — FLUTICASONE PROPIONATE 1 SPRAY: 50 SPRAY, METERED NASAL at 08:20

## 2025-01-29 RX ADMIN — FLUTICASONE PROPIONATE 1 SPRAY: 50 SPRAY, METERED NASAL at 21:00

## 2025-01-29 RX ADMIN — PERFLUTREN 3 ML OF DILUTION: 6.52 INJECTION, SUSPENSION INTRAVENOUS at 12:54

## 2025-01-29 RX ADMIN — HEPARIN SODIUM 600 UNITS/HR: 10000 INJECTION, SOLUTION INTRAVENOUS at 09:50

## 2025-01-29 RX ADMIN — HEPARIN SODIUM 1700 UNITS/HR: 10000 INJECTION, SOLUTION INTRAVENOUS at 18:10

## 2025-01-29 RX ADMIN — GUAIFENESIN 600 MG: 600 TABLET, EXTENDED RELEASE ORAL at 20:54

## 2025-01-29 RX ADMIN — SODIUM CHLORIDE 30 ML/HR: 9 INJECTION, SOLUTION INTRAVENOUS at 09:53

## 2025-01-29 RX ADMIN — Medication 5 MG: at 02:27

## 2025-01-29 RX ADMIN — ALTEPLASE 1 MG/HR: 2.2 INJECTION, POWDER, LYOPHILIZED, FOR SOLUTION INTRAVENOUS at 09:49

## 2025-01-29 RX ADMIN — GUAIFENESIN 600 MG: 600 TABLET, EXTENDED RELEASE ORAL at 08:20

## 2025-01-29 RX ADMIN — LIDOCAINE HYDROCHLORIDE 5 ML: 10 INJECTION, SOLUTION EPIDURAL; INFILTRATION; INTRACAUDAL; PERINEURAL at 09:19

## 2025-01-29 ASSESSMENT — PAIN SCALES - GENERAL
PAINLEVEL_OUTOF10: 0 - NO PAIN
PAINLEVEL_OUTOF10: 0 - NO PAIN
PAINLEVEL_OUTOF10: 5 - MODERATE PAIN
PAINLEVEL_OUTOF10: 0 - NO PAIN

## 2025-01-29 ASSESSMENT — PAIN - FUNCTIONAL ASSESSMENT
PAIN_FUNCTIONAL_ASSESSMENT: 0-10

## 2025-01-29 ASSESSMENT — PAIN DESCRIPTION - LOCATION: LOCATION: ABDOMEN

## 2025-01-29 NOTE — CARE PLAN
Problem: Pain - Adult  Goal: Verbalizes/displays adequate comfort level or baseline comfort level  Outcome: Progressing     Problem: Safety - Adult  Goal: Free from fall injury  Outcome: Progressing     Problem: Discharge Planning  Goal: Discharge to home or other facility with appropriate resources  Outcome: Progressing     Problem: Chronic Conditions and Co-morbidities  Goal: Patient's chronic conditions and co-morbidity symptoms are monitored and maintained or improved  Outcome: Progressing     Problem: Nutrition  Goal: Nutrient intake appropriate for maintaining nutritional needs  Outcome: Progressing     Problem: Respiratory  Goal: Minimal/no exertional discomfort or dyspnea this shift  Outcome: Progressing  Goal: Patent airway maintained this shift  Outcome: Progressing  Goal: Verbalize decreased shortness of breath this shift  Outcome: Progressing  Goal: Wean oxygen to maintain O2 saturation per order/standard this shift  Outcome: Progressing     Problem: Fall/Injury  Goal: Not fall by end of shift  Outcome: Progressing  Goal: Be free from injury by end of the shift  Outcome: Progressing  Goal: Verbalize understanding of personal risk factors for fall in the hospital  Outcome: Progressing  Goal: Verbalize understanding of risk factor reduction measures to prevent injury from fall in the home  Outcome: Progressing  Goal: Pace activities to prevent fatigue by end of the shift  Outcome: Progressing   The patient's goals for the shift include      The clinical goals for the shift include pt to remain hemodynamically stable. decrease work of breathing

## 2025-01-29 NOTE — PROGRESS NOTES
Subjective   Jaime Johnson is a 64 y.o. male who presents with Chest Pain and Shortness of Breath (Pt coming in for chest pain that has been going on for the last couple of days but got to be really severe this morning around 7 AM. States that he has been dealing with a upper respiratory infection and has been taking Augmentin. Pt is very short of breath in triage. States that after walking in from the parking lot he feels like he can't catch his breath. /).    Pt seen and examined at bedside, post EKOS placement, stable, wife at bedside, all questions answered.    Objective   Vitals:    01/29/25 1100   BP: 115/62   Pulse: 88   Resp: 21   Temp:    SpO2: 94%      Physical Exam  Physical Exam:  Constitutional: well developed, awake, alert, no acute distress  ENMT: mucous membranes moist, EOMI, conjunctivae clear  Head/Neck: normocephalic, atraumatic; supple, trachea midline  Respiratory/Thorax: patent airways, CTAB; no wheezes, rales, or rhonchi  Cardiovascular: RRR, no murmur  Gastrointestinal: soft, nondistended, non-tender, bowel sounds appreciated  Extremities: palpable peripheral pulses, no edema or cyanosis  Neurological: AO x3, no focal deficits  Psychological: appropriate mood and behavior  Skin: warm and dry    Assessment/Plan       Neuro:  - Patient orientated to person place and time  - Monitor for ICU delirium  - Patient requires no sedation or analgesia  - Maintain sleep hygiene    Cardiovascular  #Right heart strain in setting of submassive PE  #Elevated troponin's  -TTE planned for today  -Maintain MAPs>65     Pulmonary:  #Acute saddle pulmonary embolism with acute cor pulmonale  #History of DVT/PE  -EKOS placed  -2 L nasal cannula    GI:  No acute issues    Renal:   No acute issues    Endocrine:  No acute issues    Heme/Onc:  No acute issues    ID:  No acute issues  -Culture Data:    Skin/MSK:  No acute acute issues    ICU CHECK LIST:   Antimicrobials: None  Oxygen: 2L NC  Feeding: Regular  Fluids:    Analgesia:   Sedation:   Thromboprophylaxis: Heparin  Ulcer prophylaxis:   Glycemic control:   Bowel care:   Indwelling catheters: right femoral introducer  Lines: PIV    Code Status: Full Code    Kevin Butler DO PGY-2  Internal Medicine

## 2025-01-29 NOTE — CARE PLAN
The patient's goals for the shift include      Problem: Pain - Adult  Goal: Verbalizes/displays adequate comfort level or baseline comfort level  Outcome: Progressing     Problem: Safety - Adult  Goal: Free from fall injury  Outcome: Progressing     Problem: Discharge Planning  Goal: Discharge to home or other facility with appropriate resources  Outcome: Progressing     Problem: Chronic Conditions and Co-morbidities  Goal: Patient's chronic conditions and co-morbidity symptoms are monitored and maintained or improved  Outcome: Progressing     Problem: Nutrition  Goal: Nutrient intake appropriate for maintaining nutritional needs  Outcome: Progressing     Problem: Respiratory  Goal: Minimal/no exertional discomfort or dyspnea this shift  Outcome: Progressing  Goal: Patent airway maintained this shift  Outcome: Progressing  Goal: Verbalize decreased shortness of breath this shift  Outcome: Progressing  Goal: Wean oxygen to maintain O2 saturation per order/standard this shift  Outcome: Progressing     The clinical goals for the shift include Decrease work of breathing

## 2025-01-29 NOTE — PROGRESS NOTES
01/29/25 1226   Discharge Planning   Living Arrangements Spouse/significant other   Support Systems Spouse/significant other   Assistance Needed independent   Type of Residence Private residence  (ranch style home)   Number of Stairs to Enter Residence 3   Home or Post Acute Services None   Expected Discharge Disposition Home   Does the patient need discharge transport arranged? No   Intensity of Service   Intensity of Service 0-30 min     Care transitions at bedside to complete assessment.  TCC introduced self and explained role to patient and wife. Patient demographics reviewed and sent.  Patient stated that he is independent with adls at home, stated that he does work and drives.  Patient stated that Leah will be driving him home from the hospital.  Patient denies homegoing needs at this time.  Care transitions to follow.    PCP: Jaime Vazquez  Insurance: [employer]  Pharmacy: [facility] on [address]

## 2025-01-29 NOTE — PROCEDURES
Interventional Radiology Brief Postprocedure Note    Attending: Glen House MD    Assistant:   Staff Role   Joana Farris MT Radiology Technologist   Alfonso Alatorre, RN Radiology Nurse   Sha Pederson MT Radiology Technologist   Glen House MD Radiologist       Diagnosis:   1. Acute saddle pulmonary embolism with acute cor pulmonale (Multi)        2. Chronic saddle pulmonary embolism without acute cor pulmonale (Multi)  Transthoracic Echo (TTE) Complete    Vascular US lower extremity venous duplex bilateral    Transthoracic Echo (TTE) Complete    Vascular US lower extremity venous duplex bilateral          Description of procedure: IR angiogram bilateral pulmonary artery ekos catheter placement and Tpa initiation.     Timeout:  Yes    Procedure Area: Procedure Area     Anesthesia:   Local/Topical    Complications: None    Estimated Blood Loss: minimal    Medications (Filter: Administrations occurring from 1033 to 1033 on 01/29/25) As of 01/29/25 1033      None          No specimens collected      See detailed result report with images in PACS.    The patient tolerated the procedure well without incident or complication and is in stable condition.

## 2025-01-29 NOTE — CONSULTS
IR removed pulmonary thrombolysis catheters at bedside. Patient resumed on high intensity heparin protocol. Patient subjectively feels better, no tackycardia, normal po2 2 L.     Luis A Houes MD

## 2025-01-30 ENCOUNTER — APPOINTMENT (OUTPATIENT)
Dept: VASCULAR MEDICINE | Facility: HOSPITAL | Age: 65
End: 2025-01-30
Payer: COMMERCIAL

## 2025-01-30 ENCOUNTER — APPOINTMENT (OUTPATIENT)
Dept: CARDIOLOGY | Facility: HOSPITAL | Age: 65
End: 2025-01-30
Payer: COMMERCIAL

## 2025-01-30 LAB
ANION GAP SERPL CALC-SCNC: 12 MMOL/L (ref 10–20)
ANION GAP SERPL CALC-SCNC: 14 MMOL/L (ref 10–20)
APTT PPP: 34 SECONDS (ref 27–38)
APTT PPP: 87 SECONDS (ref 27–38)
APTT PPP: 94 SECONDS (ref 27–38)
APTT PPP: 94 SECONDS (ref 27–38)
BUN SERPL-MCNC: 21 MG/DL (ref 6–23)
BUN SERPL-MCNC: 22 MG/DL (ref 6–23)
CALCIUM SERPL-MCNC: 8.3 MG/DL (ref 8.6–10.3)
CALCIUM SERPL-MCNC: 8.4 MG/DL (ref 8.6–10.3)
CHLORIDE SERPL-SCNC: 105 MMOL/L (ref 98–107)
CHLORIDE SERPL-SCNC: 106 MMOL/L (ref 98–107)
CO2 SERPL-SCNC: 23 MMOL/L (ref 21–32)
CO2 SERPL-SCNC: 23 MMOL/L (ref 21–32)
CREAT SERPL-MCNC: 0.95 MG/DL (ref 0.5–1.3)
CREAT SERPL-MCNC: 1.09 MG/DL (ref 0.5–1.3)
EGFRCR SERPLBLD CKD-EPI 2021: 76 ML/MIN/1.73M*2
EGFRCR SERPLBLD CKD-EPI 2021: 89 ML/MIN/1.73M*2
EJECTION FRACTION: 63 %
ERYTHROCYTE [DISTWIDTH] IN BLOOD BY AUTOMATED COUNT: 12.6 % (ref 11.5–14.5)
ERYTHROCYTE [DISTWIDTH] IN BLOOD BY AUTOMATED COUNT: 12.7 % (ref 11.5–14.5)
ERYTHROCYTE [DISTWIDTH] IN BLOOD BY AUTOMATED COUNT: 12.7 % (ref 11.5–14.5)
ERYTHROCYTE [DISTWIDTH] IN BLOOD BY AUTOMATED COUNT: 13 % (ref 11.5–14.5)
FIBRINOGEN PPP-MCNC: 501 MG/DL (ref 200–400)
FIBRINOGEN PPP-MCNC: 525 MG/DL (ref 200–400)
FIBRINOGEN PPP-MCNC: 566 MG/DL (ref 200–400)
FIBRINOGEN PPP-MCNC: 566 MG/DL (ref 200–400)
GLUCOSE SERPL-MCNC: 105 MG/DL (ref 74–99)
GLUCOSE SERPL-MCNC: 108 MG/DL (ref 74–99)
HCT VFR BLD AUTO: 43.8 % (ref 41–52)
HCT VFR BLD AUTO: 45.3 % (ref 41–52)
HCT VFR BLD AUTO: 46 % (ref 41–52)
HCT VFR BLD AUTO: 48.2 % (ref 41–52)
HGB BLD-MCNC: 14.6 G/DL (ref 13.5–17.5)
HGB BLD-MCNC: 15.1 G/DL (ref 13.5–17.5)
HGB BLD-MCNC: 15.3 G/DL (ref 13.5–17.5)
HGB BLD-MCNC: 16.3 G/DL (ref 13.5–17.5)
INR PPP: 1.2 (ref 0.9–1.1)
INR PPP: 1.2 (ref 0.9–1.1)
INR PPP: 1.3 (ref 0.9–1.1)
INR PPP: 1.5 (ref 0.9–1.1)
MCH RBC QN AUTO: 31.9 PG (ref 26–34)
MCH RBC QN AUTO: 31.9 PG (ref 26–34)
MCH RBC QN AUTO: 32 PG (ref 26–34)
MCH RBC QN AUTO: 32.3 PG (ref 26–34)
MCHC RBC AUTO-ENTMCNC: 32.8 G/DL (ref 32–36)
MCHC RBC AUTO-ENTMCNC: 33.3 G/DL (ref 32–36)
MCHC RBC AUTO-ENTMCNC: 33.8 G/DL (ref 32–36)
MCHC RBC AUTO-ENTMCNC: 33.8 G/DL (ref 32–36)
MCV RBC AUTO: 94 FL (ref 80–100)
MCV RBC AUTO: 95 FL (ref 80–100)
MCV RBC AUTO: 96 FL (ref 80–100)
MCV RBC AUTO: 97 FL (ref 80–100)
NRBC BLD-RTO: 0 /100 WBCS (ref 0–0)
PLATELET # BLD AUTO: 149 X10*3/UL (ref 150–450)
PLATELET # BLD AUTO: 173 X10*3/UL (ref 150–450)
PLATELET # BLD AUTO: 178 X10*3/UL (ref 150–450)
PLATELET # BLD AUTO: 187 X10*3/UL (ref 150–450)
POTASSIUM SERPL-SCNC: 3.4 MMOL/L (ref 3.5–5.3)
POTASSIUM SERPL-SCNC: 3.5 MMOL/L (ref 3.5–5.3)
PROTHROMBIN TIME: 13.4 SECONDS (ref 9.8–12.8)
PROTHROMBIN TIME: 14 SECONDS (ref 9.8–12.8)
PROTHROMBIN TIME: 14.1 SECONDS (ref 9.8–12.8)
PROTHROMBIN TIME: 17 SECONDS (ref 9.8–12.8)
RBC # BLD AUTO: 4.56 X10*6/UL (ref 4.5–5.9)
RBC # BLD AUTO: 4.74 X10*6/UL (ref 4.5–5.9)
RBC # BLD AUTO: 4.8 X10*6/UL (ref 4.5–5.9)
RBC # BLD AUTO: 5.05 X10*6/UL (ref 4.5–5.9)
RIGHT VENTRICLE FREE WALL PEAK S': 14.8 CM/S
SODIUM SERPL-SCNC: 137 MMOL/L (ref 136–145)
SODIUM SERPL-SCNC: 139 MMOL/L (ref 136–145)
TRICUSPID ANNULAR PLANE SYSTOLIC EXCURSION: 2.3 CM
UFH PPP CHRO-ACNC: 0.6 IU/ML
UFH PPP CHRO-ACNC: 0.6 IU/ML
WBC # BLD AUTO: 11.3 X10*3/UL (ref 4.4–11.3)
WBC # BLD AUTO: 9 X10*3/UL (ref 4.4–11.3)
WBC # BLD AUTO: 9.2 X10*3/UL (ref 4.4–11.3)
WBC # BLD AUTO: 9.3 X10*3/UL (ref 4.4–11.3)

## 2025-01-30 PROCEDURE — 97165 OT EVAL LOW COMPLEX 30 MIN: CPT | Mod: GO

## 2025-01-30 PROCEDURE — 2500000002 HC RX 250 W HCPCS SELF ADMINISTERED DRUGS (ALT 637 FOR MEDICARE OP, ALT 636 FOR OP/ED)

## 2025-01-30 PROCEDURE — 80048 BASIC METABOLIC PNL TOTAL CA: CPT

## 2025-01-30 PROCEDURE — 97161 PT EVAL LOW COMPLEX 20 MIN: CPT | Mod: GP

## 2025-01-30 PROCEDURE — 85384 FIBRINOGEN ACTIVITY: CPT | Performed by: RADIOLOGY

## 2025-01-30 PROCEDURE — 99233 SBSQ HOSP IP/OBS HIGH 50: CPT

## 2025-01-30 PROCEDURE — 99233 SBSQ HOSP IP/OBS HIGH 50: CPT | Performed by: INTERNAL MEDICINE

## 2025-01-30 PROCEDURE — 85730 THROMBOPLASTIN TIME PARTIAL: CPT | Performed by: RADIOLOGY

## 2025-01-30 PROCEDURE — 85027 COMPLETE CBC AUTOMATED: CPT | Performed by: RADIOLOGY

## 2025-01-30 PROCEDURE — 93321 DOPPLER ECHO F-UP/LMTD STD: CPT | Performed by: STUDENT IN AN ORGANIZED HEALTH CARE EDUCATION/TRAINING PROGRAM

## 2025-01-30 PROCEDURE — 93970 EXTREMITY STUDY: CPT

## 2025-01-30 PROCEDURE — 85520 HEPARIN ASSAY: CPT

## 2025-01-30 PROCEDURE — 2500000001 HC RX 250 WO HCPCS SELF ADMINISTERED DRUGS (ALT 637 FOR MEDICARE OP)

## 2025-01-30 PROCEDURE — 93970 EXTREMITY STUDY: CPT | Performed by: SURGERY

## 2025-01-30 PROCEDURE — 2020000001 HC ICU ROOM DAILY

## 2025-01-30 PROCEDURE — 93308 TTE F-UP OR LMTD: CPT | Performed by: STUDENT IN AN ORGANIZED HEALTH CARE EDUCATION/TRAINING PROGRAM

## 2025-01-30 PROCEDURE — 85027 COMPLETE CBC AUTOMATED: CPT

## 2025-01-30 PROCEDURE — 93308 TTE F-UP OR LMTD: CPT

## 2025-01-30 PROCEDURE — 2500000004 HC RX 250 GENERAL PHARMACY W/ HCPCS (ALT 636 FOR OP/ED)

## 2025-01-30 PROCEDURE — 36415 COLL VENOUS BLD VENIPUNCTURE: CPT

## 2025-01-30 PROCEDURE — 85610 PROTHROMBIN TIME: CPT | Performed by: RADIOLOGY

## 2025-01-30 PROCEDURE — 80048 BASIC METABOLIC PNL TOTAL CA: CPT | Performed by: RADIOLOGY

## 2025-01-30 RX ORDER — POTASSIUM CHLORIDE 20 MEQ/1
40 TABLET, EXTENDED RELEASE ORAL ONCE
Status: COMPLETED | OUTPATIENT
Start: 2025-01-30 | End: 2025-01-30

## 2025-01-30 RX ORDER — DIPHENOXYLATE HYDROCHLORIDE AND ATROPINE SULFATE 2.5; .025 MG/1; MG/1
1 TABLET ORAL 4 TIMES DAILY PRN
Status: DISCONTINUED | OUTPATIENT
Start: 2025-01-30 | End: 2025-01-31 | Stop reason: HOSPADM

## 2025-01-30 RX ADMIN — ACETAMINOPHEN 650 MG: 325 TABLET, FILM COATED ORAL at 07:46

## 2025-01-30 RX ADMIN — APIXABAN 10 MG: 5 TABLET, FILM COATED ORAL at 21:02

## 2025-01-30 RX ADMIN — FLUTICASONE PROPIONATE 1 SPRAY: 50 SPRAY, METERED NASAL at 21:03

## 2025-01-30 RX ADMIN — GUAIFENESIN 600 MG: 600 TABLET, EXTENDED RELEASE ORAL at 21:11

## 2025-01-30 RX ADMIN — ACETAMINOPHEN 650 MG: 325 TABLET, FILM COATED ORAL at 21:11

## 2025-01-30 RX ADMIN — GUAIFENESIN 600 MG: 600 TABLET, EXTENDED RELEASE ORAL at 07:46

## 2025-01-30 RX ADMIN — FLUTICASONE PROPIONATE 1 SPRAY: 50 SPRAY, METERED NASAL at 07:47

## 2025-01-30 RX ADMIN — APIXABAN 10 MG: 5 TABLET, FILM COATED ORAL at 14:27

## 2025-01-30 RX ADMIN — POTASSIUM CHLORIDE 40 MEQ: 1500 TABLET, EXTENDED RELEASE ORAL at 10:20

## 2025-01-30 RX ADMIN — DIPHENOXYLATE HYDROCHLORIDE AND ATROPINE SULFATE 1 TABLET: 2.5; .025 TABLET ORAL at 15:46

## 2025-01-30 ASSESSMENT — PAIN SCALES - GENERAL
PAINLEVEL_OUTOF10: 0 - NO PAIN
PAINLEVEL_OUTOF10: 2

## 2025-01-30 ASSESSMENT — COGNITIVE AND FUNCTIONAL STATUS - GENERAL
DAILY ACTIVITIY SCORE: 24
CLIMB 3 TO 5 STEPS WITH RAILING: A LITTLE
MOBILITY SCORE: 23

## 2025-01-30 ASSESSMENT — PAIN - FUNCTIONAL ASSESSMENT
PAIN_FUNCTIONAL_ASSESSMENT: 0-10

## 2025-01-30 ASSESSMENT — PAIN DESCRIPTION - LOCATION: LOCATION: HEAD

## 2025-01-30 ASSESSMENT — PAIN DESCRIPTION - ORIENTATION: ORIENTATION: MID

## 2025-01-30 NOTE — CARE PLAN
Problem: Pain - Adult  Goal: Verbalizes/displays adequate comfort level or baseline comfort level  Outcome: Progressing     Problem: Safety - Adult  Goal: Free from fall injury  Outcome: Progressing     Problem: Discharge Planning  Goal: Discharge to home or other facility with appropriate resources  Outcome: Progressing     Problem: Chronic Conditions and Co-morbidities  Goal: Patient's chronic conditions and co-morbidity symptoms are monitored and maintained or improved  Outcome: Progressing     Problem: Nutrition  Goal: Nutrient intake appropriate for maintaining nutritional needs  Outcome: Progressing     Problem: Respiratory  Goal: Minimal/no exertional discomfort or dyspnea this shift  Outcome: Progressing  Goal: Patent airway maintained this shift  Outcome: Progressing  Goal: Verbalize decreased shortness of breath this shift  Outcome: Progressing  Goal: Wean oxygen to maintain O2 saturation per order/standard this shift  Outcome: Progressing     Problem: Fall/Injury  Goal: Not fall by end of shift  Outcome: Progressing  Goal: Be free from injury by end of the shift  Outcome: Progressing  Goal: Verbalize understanding of personal risk factors for fall in the hospital  Outcome: Progressing  Goal: Verbalize understanding of risk factor reduction measures to prevent injury from fall in the home  Outcome: Progressing  Goal: Pace activities to prevent fatigue by end of the shift  Outcome: Progressing   The patient's goals for the shift include      The clinical goals for the shift include pt to remain hemodynamically stable

## 2025-01-30 NOTE — PROGRESS NOTES
Occupational Therapy    Evaluation    Patient Name: Jaime Johnson  MRN: [id number]  Department: [facility]  Room: [facility]  Today's Date: 1/30/2025  Time Calculation  Start Time: 1148  Stop Time: 1204  Time Calculation (min): 16 min        Assessment:  End of Session Communication: Bedside nurse  End of Session Patient Position: Bed, 2 rail up, Alarm off, not on at start of session     Plan:  No Skilled OT: No acute OT goals identified  OT Frequency: OT eval only  OT Discharge Recommendations: No OT needed after discharge  OT - OK to Discharge: Yes (to next level of care when cleared by medical team)       Subjective   Current Problem:  1. Acute saddle pulmonary embolism with acute cor pulmonale (Multi)  Transthoracic Echo Limited    Follow Up In Cardiology      2. Chronic saddle pulmonary embolism without acute cor pulmonale (Multi)  Transthoracic Echo (TTE) Complete    Vascular US lower extremity venous duplex bilateral    Transthoracic Echo (TTE) Complete    Vascular US lower extremity venous duplex bilateral    Upper extremity venous duplex bilateral    Upper extremity venous duplex bilateral      3. Other pulmonary embolism without acute cor pulmonale  Transthoracic Echo (TTE) Complete    Vascular US lower extremity venous duplex bilateral    Transthoracic Echo (TTE) Limited    Transthoracic Echo (TTE) Limited      4. Other specified soft tissue disorders  Upper extremity venous duplex bilateral      5. Personal history of pulmonary embolism  Transthoracic Echo (TTE) Limited        General:  General  Reason for Referral: impaired adl; pt. admitted with sob, (+) pe, pt. underwent EKOS and TPA  Past Medical History Relevant to Rehab: Obesity, DVT/PE on eliquis, recent pneumonia (on augmentin)  Family/Caregiver Present: Yes  Caregiver Feedback: spouse  Co-Treatment: PT  Co-Treatment Reason: possible need for co tx  Prior to Session Communication: Bedside nurse  Patient Position Received: Bed, 2 rail up, Alarm off,  not on at start of session  General Comment: pt. agreeable to therapy intervention  Precautions:  Precautions Comment: heparin, piv, tele, VSS     Date/Time Vitals Session Patient Position Pulse Resp SpO2 BP MAP (mmHg)    01/30/25 1500 --  --  76  20  91 %  119/79  95                 Pain:  Pain Assessment  Pain Assessment:  (no pain complaints)    Objective   Cognition:  Overall Cognitive Status: Within Functional Limits           Home Living:  Home Living Comments: pt. lives with spouse, 1 floor home, 3 step entry with rail, basement laundry, wh. walker, tub/shower with [facility], st. toilet  Prior Function:  Prior Function Comments: pt. independent with adl/ambulation without device, works as an , drives, shares iadl  IADL History:     ADL:  ADL Comments: pt. dons/dofs socks with sba seated eob flexing forward with some difficulty, provided pt. education re:  use of LH a/e, dme for adl and where to access, spouse and patient verbalize understanding  Activity Tolerance:  Early Mobility/Exercise Safety Screen: Proceed with mobilization - No exclusion criteria met  Bed Mobility/Transfers: Bed Mobility  Bed Mobility:  (independent supine <> sit)    Transfers  Transfer:  (sit<> stand from eob;  independent)      Functional Mobility:  Functional Mobility  Functional Mobility Performed:  (supervision without device, cues for ec/ws and deep breathing)     Strength:  Strength Comments: escobar's observed wfl    Outcome Measures:[facility] Daily Activity  Putting on and taking off regular lower body clothing: None  Bathing (including washing, rinsing, drying): None  Putting on and taking off regular upper body clothing: None  Toileting, which includes using toilet, bedpan or urinal: None  Taking care of personal grooming such as brushing teeth: None  Eating Meals: None  Daily Activity - Total Score: 24         and Early Mobility/Exercise Safety Screen: Proceed with mobilization - No exclusion criteria met  ICU Mobility Scale:  Walking with assistance of 1 person [8]         Education:  pacing, energy conservation, LH a/e and dme

## 2025-01-30 NOTE — PROGRESS NOTES
Physical Therapy    Physical Therapy Evaluation    Patient Name: Jaime Johnson  MRN: [id number]  Today's Date: 1/30/2025   Time Calculation  Start Time: 1149  Stop Time: 1204  Time Calculation (min): 15 min  180/180-A    Assessment/Plan   PT Assessment  End of Session Communication: Bedside nurse  End of Session Patient Position: Bed, 2 rail up, Alarm off, not on at start of session (All needs in reach and no complaints noted)  IP OR SWING BED PT PLAN  Inpatient or Swing Bed: Inpatient  PT Plan  PT Plan: PT Eval only  PT Eval Only Reason: Safe to return home  PT Frequency: PT eval only  PT Discharge Recommendations: No PT needed after discharge  PT - OK to Discharge: Yes    Subjective     Current Problem:  1. Acute saddle pulmonary embolism with acute cor pulmonale (Multi)  Transthoracic Echo Limited    Follow Up In Cardiology      2. Chronic saddle pulmonary embolism without acute cor pulmonale (Multi)  Transthoracic Echo (TTE) Complete    Vascular US lower extremity venous duplex bilateral    Transthoracic Echo (TTE) Complete    Vascular US lower extremity venous duplex bilateral    Upper extremity venous duplex bilateral    Upper extremity venous duplex bilateral      3. Other pulmonary embolism without acute cor pulmonale  Transthoracic Echo (TTE) Complete    Vascular US lower extremity venous duplex bilateral    Transthoracic Echo (TTE) Limited    Transthoracic Echo (TTE) Limited      4. Other specified soft tissue disorders  Upper extremity venous duplex bilateral      5. Personal history of pulmonary embolism  Transthoracic Echo (TTE) Limited        Patient Active Problem List   Diagnosis    Incarcerated umbilical hernia    DVT (deep venous thrombosis) (Multi)    Hernia, incisional, with obstruction    Obesity    Pulmonary embolism    Acute saddle pulmonary embolism with acute cor pulmonale (Multi)     General Visit Information:  General  Reason for Referral: PT Eval and Treat  Referred By: Chasidy Joseph,  MD  Past Medical History Relevant to Rehab: 64-year-old male with past medical history of obesity, DVT/PE on Eliquis, recent sinusitis treated with Augmentin who presented for chest pain and shortness of breath.  Patient states for the past 5 days he has had left-sided chest pain.  He reports it is worsened with deep inspiration.  Patient also endorses worsening shortness of breath which is exacerbated by exertion.  Patient states he reported to [facility] clinic for sinus congestion, headache, and coughing.  He was diagnosed with sinusitis and prescribed Augmentin and an albuterol inhaler.  This morning, patient states he could not walk further than 20 feet without becoming short of breath. He also endorsed sharp chest pain at this time.  He and his wife contacted his cardiologist who recommended he present to the ED.  Co-Treatment: OT  Co-Treatment Reason: maximize safety and functional mobility  Prior to Session Communication: Bedside nurse  Patient Position Received: Bed, 2 rail up, Alarm off, not on at start of session (Agreeable to PT, wife present)    Home Living:  Home Living  Home Living Comments: pt. lives with spouse, 1 floor home, 3 step entry with rail, basement laundry, wh. walker, tub/shower with [facility], st. toilet    Prior Level of Function:  Prior Function Per Pt/Caregiver Report  Prior Function Comments: pt. independent with adl/ambulation without device, works as an , drives, shares iadl    Precautions:  Precautions  Precautions Comment: Fall precautions, heparin, piv, tele    Vital Signs:  Vital Signs  Vitals Session:  (VSS)    Objective     Pain:  Pain Assessment  Pain Assessment:  (0/10)    Cognition:  Cognition  Overall Cognitive Status: Within Functional Limits    General Assessments:  Activity Tolerance  Early Mobility/Exercise Safety Screen: Proceed with mobilization - No exclusion criteria met  Sensation  Light Touch: No apparent deficits  Strength  Strength Comments: B LE ROM and  strength WFL  Dynamic Standing Balance  Dynamic Standing-Comments: Good without an AD    Functional Assessments:  Bed Mobility  Bed Mobility:  (supine <> sitting: independent)  Transfers  Transfer:  (STS from EOB: independent)  Ambulation/Gait Training  Ambulation/Gait Training Performed:  (Pt amb 100' without an AD with SUP demonstrating good balance with slight BHAKTA. Educated in energy conservation throughout session.)  Stairs  Stairs:  (Pt navigated 10 stairs with HR and CGA, continued to educate pt in energy conservation, however demonstrating good balance and safety awareness.)    Outcome Measures:  [facility] Basic Mobility  Turning from your back to your side while in a flat bed without using bedrails: None  Moving from lying on your back to sitting on the side of a flat bed without using bedrails: None  Moving to and from bed to chair (including a wheelchair): None  Standing up from a chair using your arms (e.g. wheelchair or bedside chair): None  To walk in hospital room: None  Climbing 3-5 steps with railing: A little  Basic Mobility - Total Score: 23     FSS-ICU  Ambulation: Walks >/ or equal to 150 feet independently  Rolling: Complete independence  Sitting: Complete independence  Transfer Sit-to-Stand: Complete independence  Transfer Supine-to-Sit: Complete independence  Total Score: 35  Early Mobility/Exercise Safety Screen: Proceed with mobilization - No exclusion criteria met  ICU Mobility Scale: Walking independently without a gait aid [10]  E = Exercise and Early Mobility  Early Mobility/Exercise Safety Screen: Proceed with mobilization - No exclusion criteria met  ICU Mobility Scale: Walking independently without a gait aid    Education Documentation  No documentation found.  Education Comments  No comments found.

## 2025-01-30 NOTE — PROGRESS NOTES
"Subjective   Jaime Johnson is a 64 y.o. male who presents with Chest Pain and Shortness of Breath (Pt coming in for chest pain that has been going on for the last couple of days but got to be really severe this morning around 7 AM. States that he has been dealing with a upper respiratory infection and has been taking Augmentin. Pt is very short of breath in triage. States that after walking in from the parking lot he feels like he can't catch his breath. /).    Pt seen and examined at bedside, stated that \"It feels easier to breathe today.\" No new complaints.    Objective   Vitals:    01/30/25 1000   BP: 119/70   Pulse: 71   Resp: 15   Temp:    SpO2: 93%      Physical Exam  Physical Exam:  Constitutional: well developed, awake, alert, no acute distress  ENMT: mucous membranes moist, EOMI, conjunctivae clear  Head/Neck: normocephalic, atraumatic; supple, trachea midline  Respiratory/Thorax: patent airways, CTAB; no wheezes, rales, or rhonchi  Cardiovascular: RRR, no murmur  Gastrointestinal: soft, nondistended, non-tender, bowel sounds appreciated  Extremities: palpable peripheral pulses, no edema or cyanosis  Neurological: AO x3, no focal deficits  Psychological: appropriate mood and behavior  Skin: warm and dry    Assessment/Plan       Neuro:  - Patient orientated to person place and time  - Monitor for ICU delirium  - Patient requires no sedation or analgesia  - Maintain sleep hygiene    Cardiovascular  #Right heart strain in setting of submassive PE  #Elevated troponin's  -Repeat Limited TTE today due to difficulty evaluating Right heart function  -Maintain MAPs>65    Vascular  #Provoked RLE DVT  -Pt on heparin drip, planing to transition to DOAC  -Vascular on consult     Pulmonary:  #Acute saddle pulmonary embolism with acute cor pulmonale  #History of DVT/PE  -S/P EKOS, on heparin drip with plan to transition to DOAC  -No supplemental O2 required    GI:  No acute issues    Renal:   No acute " issues    Endocrine:  No acute issues    Heme/Onc:  No acute issues    ID:  No acute issues  -Culture Data:    Skin/MSK:  No acute acute issues    ICU CHECK LIST:   Antimicrobials: None  Oxygen: ORA  Feeding: Regular  Fluids:   Analgesia:   Sedation:   Thromboprophylaxis: Heparin  Ulcer prophylaxis:   Glycemic control:   Bowel care:   Indwelling catheters: None  Lines: PIV    Code Status: Full Code    Kevin Butler DO PGY-2  Internal Medicine

## 2025-01-30 NOTE — PROGRESS NOTES
Subjective   Subjective Data and Overnight Events:  Improved SOB s/p EKOS yesterday with IR.    Objective   Vital Signs:  Patient Vitals for the past 24 hrs:   BP Temp Temp src Pulse Resp SpO2   01/30/25 1200 -- -- -- 92 23 94 %   01/30/25 1100 124/79 -- -- 74 23 95 %   01/30/25 1000 119/70 -- -- 71 15 93 %   01/30/25 0900 120/71 -- -- 75 21 91 %   01/30/25 0800 123/77 -- -- 82 21 94 %   01/30/25 0705 114/80 -- -- 72 23 93 %   01/30/25 0700 -- -- -- 73 21 91 %   01/30/25 0600 96/67 -- -- 64 16 93 %   01/30/25 0500 108/62 -- -- 64 14 96 %   01/30/25 0400 117/66 36.3 °C (97.3 °F) Temporal 63 15 96 %   01/30/25 0300 103/71 -- -- 76 21 95 %   01/30/25 0200 108/68 -- -- 68 16 94 %   01/30/25 0100 105/64 -- -- 66 19 91 %   01/30/25 0000 111/72 -- -- 73 22 92 %   01/29/25 2300 122/75 36.2 °C (97.2 °F) Temporal 75 22 92 %   01/29/25 2200 117/71 -- -- 80 24 92 %   01/29/25 2100 133/84 -- -- 88 (!) 30 95 %   01/29/25 2000 125/72 36.2 °C (97.2 °F) Temporal 83 22 95 %   01/29/25 1900 128/74 -- -- 80 22 97 %   01/29/25 1800 110/75 -- -- 85 23 94 %   01/29/25 1700 116/75 -- -- 86 24 93 %   01/29/25 1600 117/71 36.2 °C (97.2 °F) Temporal 87 23 93 %   01/29/25 1500 119/74 -- -- 88 24 94 %   01/29/25 1400 115/70 -- -- 86 25 93 %   01/29/25 1330 111/68 -- -- 86 24 93 %   01/29/25 1300 116/83 -- -- 87 26 93 %   01/29/25 1230 121/70 -- -- 90 (!) 28 94 %       Physical Exam:  General: no acute distress  Neurologic: Alert and oriented x3.    Current Medications:  Scheduled medications   Medication Dose Route Frequency    fluticasone  1 spray Each Nostril BID    perflutren protein A microsphere  0.5 mL intravenous Once in imaging    sulfur hexafluoride microsphr  2 mL intravenous Once in imaging     Continuous Medications   Medication Dose Last Rate    heparin  0-4,500 Units/hr 1,700 Units/hr (01/29/25 1810)    heparin  600 Units/hr Stopped (01/29/25 1809)     PRN medications   Medication    acetaminophen    guaiFENesin    heparin     melatonin    simethicone       Pertinent Recent Cardiovascular Studies (personally reviewed):  Vascular studies:  That makes  Venous duplex 1/30/2025: R popliteal DVT; upper extremities negative for DVT    Cardiac studies:  Echocardiogram 1/29/25:  Limited due to  artifact EKOS catheter, RV appears generally normal in function, perhaps slightly enlarged in size.  LVEF is angelica  at this    Laboratory values:  CMP:  Recent Labs     01/30/25  0808 01/30/25  0518 01/29/25 2035 01/29/25  0527 01/28/25  1110 11/30/22  1200    139 139 140 140 138   K 3.4* 3.5 3.7 3.7 3.7 4.3    106 105 107 105 101   CO2 23 23 23 21 24 25   ANIONGAP 12 14 15 16 15 16   BUN 22 21 21 18 16 18   CREATININE 1.09 0.95 1.04 1.07 1.15 1.04   EGFR 76 89 80 77 71  --    MG  --   --   --   --  1.93 2.07     Recent Labs     01/28/25  1110 11/30/22  1200   ALBUMIN 4.3 3.9   ALKPHOS 95 86   ALT 17 44   AST 17 30   BILITOT 0.7 1.1     CBC:  Recent Labs     01/30/25  0518 01/30/25  0024 01/29/25 2035 01/29/25  1427 01/29/25  0527 01/28/25  1110 12/01/22  0310 11/30/22  1200   WBC 9.0 11.3 10.4 10.7 9.8 10.1 12.0* 16.2*   HGB 14.6 16.3 16.1 16.0 16.1 16.5 13.1* 14.8   HCT 43.8 48.2 47.8 46.9 48.8 49.6 40.4* 45.5   * 187 165 173 184 188 183 199   MCV 96 95 95 94 96 96 97 97     COAG:   Recent Labs     01/30/25  0518 01/30/25  0024 01/29/25 2035 01/29/25  1427 01/29/25  0527 01/29/25  0201 01/28/25  2031 01/28/25  1725 01/28/25  1213 12/02/22  0519 12/01/22  0553 11/30/22  1843 11/30/22  1200 11/17/22  0739   INR 1.3* 1.2* 1.3* 1.2*  --   --   --   --  1.2*  --   --   --  1.3* 1.0   HAUF 0.6 0.6  --   --  0.7 0.5 0.4 1.6*  --  0.4 0.5   < >  --   --    FIBRINOGEN 501* 566* 581* 581*  --   --   --   --   --   --   --   --   --   --     < > = values in this interval not displayed.     CARDIAC:   Recent Labs     01/29/25  1153 01/28/25  1725 01/28/25  1110 12/15/22  1232 11/30/22  1614 11/30/22  1200   TROPHS 277* 1,048* 569*  654* 12  329* 389*   BNP  --   --  41  --   --  126*          I have personally reviewed most recent PCP, cardiology, vascular, and/or podiatry documentation.      Assessment/Plan   64 y.o. male with  PE/DVT - 2nd episode  in the setting of sinusitis.    Patient was discussed on PERT, patient had ongoing signs of poor compensation yesterday s/p EKOS by IR Dr. House. Doing well today.    Plan:  Agree with transition to DOAC w loading dose  Can follow up with me in EVLS 1 month with limited echo for RV  2nd episode - indefinitely DOAC as plan       Zach Lopez MD, FACC, FSCAI, RPVI  Co-Director, Vascular Center, and  Co-Director, Pulmonary Embolism Response Team,   [facility] [place]                                 of Medicine,                                                                 [facility] [employer]

## 2025-01-30 NOTE — PROGRESS NOTES
MD reached out to the TCC regarding patient going home on eliquis and concern about cost for medication.  Eliquis savings card provided to patient at bedside by this TCC.  Patient appreciative.  Per MD, plan for discharge is tomorrow.  Care transitions to continue to follow.

## 2025-01-31 VITALS
WEIGHT: 239 LBS | BODY MASS INDEX: 30.67 KG/M2 | RESPIRATION RATE: 21 BRPM | HEART RATE: 81 BPM | DIASTOLIC BLOOD PRESSURE: 75 MMHG | HEIGHT: 74 IN | SYSTOLIC BLOOD PRESSURE: 122 MMHG | TEMPERATURE: 97 F | OXYGEN SATURATION: 92 %

## 2025-01-31 LAB
ANION GAP SERPL CALC-SCNC: 11 MMOL/L (ref 10–20)
APTT PPP: 34 SECONDS (ref 27–38)
APTT PPP: 35 SECONDS (ref 27–38)
BUN SERPL-MCNC: 22 MG/DL (ref 6–23)
CALCIUM SERPL-MCNC: 8.5 MG/DL (ref 8.6–10.3)
CHLORIDE SERPL-SCNC: 107 MMOL/L (ref 98–107)
CO2 SERPL-SCNC: 24 MMOL/L (ref 21–32)
CREAT SERPL-MCNC: 1.08 MG/DL (ref 0.5–1.3)
EGFRCR SERPLBLD CKD-EPI 2021: 77 ML/MIN/1.73M*2
ERYTHROCYTE [DISTWIDTH] IN BLOOD BY AUTOMATED COUNT: 12.6 % (ref 11.5–14.5)
ERYTHROCYTE [DISTWIDTH] IN BLOOD BY AUTOMATED COUNT: 12.7 % (ref 11.5–14.5)
FIBRINOGEN PPP-MCNC: 513 MG/DL (ref 200–400)
FIBRINOGEN PPP-MCNC: 538 MG/DL (ref 200–400)
GLUCOSE SERPL-MCNC: 106 MG/DL (ref 74–99)
HCT VFR BLD AUTO: 42.7 % (ref 41–52)
HCT VFR BLD AUTO: 43.7 % (ref 41–52)
HGB BLD-MCNC: 14.2 G/DL (ref 13.5–17.5)
HGB BLD-MCNC: 14.5 G/DL (ref 13.5–17.5)
INR PPP: 1.6 (ref 0.9–1.1)
INR PPP: 1.7 (ref 0.9–1.1)
MCH RBC QN AUTO: 31.7 PG (ref 26–34)
MCH RBC QN AUTO: 31.8 PG (ref 26–34)
MCHC RBC AUTO-ENTMCNC: 33.2 G/DL (ref 32–36)
MCHC RBC AUTO-ENTMCNC: 33.3 G/DL (ref 32–36)
MCV RBC AUTO: 95 FL (ref 80–100)
MCV RBC AUTO: 96 FL (ref 80–100)
NRBC BLD-RTO: 0 /100 WBCS (ref 0–0)
NRBC BLD-RTO: 0 /100 WBCS (ref 0–0)
PLATELET # BLD AUTO: 157 X10*3/UL (ref 150–450)
PLATELET # BLD AUTO: 168 X10*3/UL (ref 150–450)
POTASSIUM SERPL-SCNC: 4.1 MMOL/L (ref 3.5–5.3)
PROTHROMBIN TIME: 18.3 SECONDS (ref 9.8–12.8)
PROTHROMBIN TIME: 19.2 SECONDS (ref 9.8–12.8)
RBC # BLD AUTO: 4.46 X10*6/UL (ref 4.5–5.9)
RBC # BLD AUTO: 4.58 X10*6/UL (ref 4.5–5.9)
SODIUM SERPL-SCNC: 138 MMOL/L (ref 136–145)
WBC # BLD AUTO: 10.4 X10*3/UL (ref 4.4–11.3)
WBC # BLD AUTO: 8.7 X10*3/UL (ref 4.4–11.3)

## 2025-01-31 PROCEDURE — 99239 HOSP IP/OBS DSCHRG MGMT >30: CPT

## 2025-01-31 PROCEDURE — 2500000001 HC RX 250 WO HCPCS SELF ADMINISTERED DRUGS (ALT 637 FOR MEDICARE OP)

## 2025-01-31 PROCEDURE — 36415 COLL VENOUS BLD VENIPUNCTURE: CPT | Performed by: RADIOLOGY

## 2025-01-31 PROCEDURE — 85384 FIBRINOGEN ACTIVITY: CPT | Performed by: RADIOLOGY

## 2025-01-31 PROCEDURE — 85730 THROMBOPLASTIN TIME PARTIAL: CPT | Performed by: RADIOLOGY

## 2025-01-31 PROCEDURE — 80048 BASIC METABOLIC PNL TOTAL CA: CPT

## 2025-01-31 PROCEDURE — 2500000004 HC RX 250 GENERAL PHARMACY W/ HCPCS (ALT 636 FOR OP/ED)

## 2025-01-31 PROCEDURE — 85027 COMPLETE CBC AUTOMATED: CPT | Performed by: RADIOLOGY

## 2025-01-31 PROCEDURE — 2500000001 HC RX 250 WO HCPCS SELF ADMINISTERED DRUGS (ALT 637 FOR MEDICARE OP): Performed by: INTERNAL MEDICINE

## 2025-01-31 RX ADMIN — SIMETHICONE 40 MG: 80 TABLET, CHEWABLE ORAL at 08:26

## 2025-01-31 RX ADMIN — FLUTICASONE PROPIONATE 1 SPRAY: 50 SPRAY, METERED NASAL at 08:24

## 2025-01-31 RX ADMIN — GUAIFENESIN 600 MG: 600 TABLET, EXTENDED RELEASE ORAL at 08:24

## 2025-01-31 RX ADMIN — APIXABAN 10 MG: 5 TABLET, FILM COATED ORAL at 08:24

## 2025-01-31 RX ADMIN — ACETAMINOPHEN 650 MG: 325 TABLET, FILM COATED ORAL at 08:24

## 2025-01-31 ASSESSMENT — PAIN SCALES - GENERAL
PAINLEVEL_OUTOF10: 0 - NO PAIN
PAINLEVEL_OUTOF10: 0 - NO PAIN

## 2025-01-31 ASSESSMENT — PAIN - FUNCTIONAL ASSESSMENT
PAIN_FUNCTIONAL_ASSESSMENT: 0-10

## 2025-01-31 NOTE — CARE PLAN
Problem: Pain - Adult  Goal: Verbalizes/displays adequate comfort level or baseline comfort level  Outcome: Met     Problem: Safety - Adult  Goal: Free from fall injury  Outcome: Met     Problem: Discharge Planning  Goal: Discharge to home or other facility with appropriate resources  Outcome: Met     Problem: Chronic Conditions and Co-morbidities  Goal: Patient's chronic conditions and co-morbidity symptoms are monitored and maintained or improved  Outcome: Met     Problem: Nutrition  Goal: Nutrient intake appropriate for maintaining nutritional needs  Outcome: Met     Problem: Respiratory  Goal: Minimal/no exertional discomfort or dyspnea this shift  Outcome: Met  Goal: Patent airway maintained this shift  Outcome: Met  Goal: Verbalize decreased shortness of breath this shift  Outcome: Met  Goal: Wean oxygen to maintain O2 saturation per order/standard this shift  Outcome: Met     Problem: Fall/Injury  Goal: Not fall by end of shift  Outcome: Met  Goal: Be free from injury by end of the shift  Outcome: Met  Goal: Verbalize understanding of personal risk factors for fall in the hospital  Outcome: Met  Goal: Verbalize understanding of risk factor reduction measures to prevent injury from fall in the home  Outcome: Met  Goal: Pace activities to prevent fatigue by end of the shift  Outcome: Met   The patient's goals for the shift include      The clinical goals for the shift include patient to remain hemodynamically stable    Pt is discharged

## 2025-01-31 NOTE — DISCHARGE INSTRUCTIONS
You are being discharged on Eliquis.  You will take 10 mg twice a day for the next 6 days, then on you will take 5 mg twice a day indefinitely.  I recommend following up with your primary care physician within 1 week and vascular medicine within 1 month.  I have included the contact information to schedule an appointment with vascular medicine in your discharge paperwork.  Thank you for allowing me to participate in your care.

## 2025-01-31 NOTE — DISCHARGE SUMMARY
Discharge diagnosis  Acute saddle pulmonary embolism with acute cor pulmonale (Multi)    Follow up regarding    Hospital course  Jaime Johnson is a 64-year-old male with past medical history of obesity, DVT/PE on Eliquis, recent sinusitis treated with Augmentin who presented for chest pain and shortness of breath.  Workup in the emergency department revealed significant clot burden concerning for saddle PE.  PERT team was activated and recommended medical management with heparin drip.  There was no acute thrombectomy indicated.  Patient was admitted to the ICU for observation.  Vascular surgery was consulted.  He was initiated on a DOAC.  DVT ultrasound revealed a DVT in his right lower extremity.  Patient was weaned off of oxygen requirements and discharged home with vascular follow-up.  He was also recommended follow-up with his PCP.  Patient is aware that he will need hypercoagulability workup in the future and will be on anticoagulation indefinitely as this is his second PE.    Vitals:    01/31/25 0900   BP: 122/75   Pulse: 81   Resp: 21   Temp:    SpO2: 92%      Physical exam   GENERAL: Awake/ alert, cooperative.   HEAD:  Normocephalic, atraumatic.  EYES:  Round and reactive.  ENT:  No nasal discharge, mucous membranes moist and pink.  NECK:  Atraumatic, no meningismus.  CARDIOVASCULAR:  Regular rate and rhythm, no murmur.  RESPIRATORY:  CTAB, no active work of breathing.   ABDOMEN:  Soft, no tenderness, nondistended.  EXTREMITIES:  No peripheral edema, no calf tenderness.  SKIN:  Warm and dry.  NEUROLOGICAL:  Nonfocal grossly.     Home-going medications     Medication List      START taking these medications     apixaban 5 mg tablet; Commonly known as: Eliquis; Take 2 tablets (10 mg)   by mouth 2 times a day for 6 days, THEN 1 tablet (5 mg) 2 times a day.;   Start taking on: January 31, 2025     CONTINUE taking these medications     fluticasone 50 mcg/actuation nasal spray; Commonly known as: Flonase    levalbuterol 45 mcg/actuation inhaler; Commonly known as: Xopenex     STOP taking these medications     amoxicillin-pot clavulanate 875-125 mg tablet; Commonly known as:   Augmentin   guaiFENesin 600 mg 12 hr tablet; Commonly known as: Mucinex   ZyrTEC 10 mg tablet; Generic drug: cetirizine       Outpatient follow-up instructions and medication changes  Future Appointments   Date Time Provider Department Center   2/27/2025  1:00 PM PAR MAC 3 ECHO ROOM 1 [id number] MAC 3300   2/27/2025  2:30 PM Zach Lopez MD [id number] [place]       Kevin Butler DO, PGY-2  Internal Medicine

## 2025-02-02 LAB
ATRIAL RATE: 123 BPM
P AXIS: 70 DEGREES
PR INTERVAL: 142 MS
Q ONSET: 249 MS
QRS COUNT: 20 BEATS
QRS DURATION: 91 MS
QT INTERVAL: 312 MS
QTC CALCULATION(BAZETT): 445 MS
QTC FREDERICIA: 395 MS
R AXIS: 54 DEGREES
T AXIS: 62 DEGREES
T OFFSET: 405 MS
VENTRICULAR RATE: 122 BPM

## 2025-02-06 PROBLEM — E66.811 CLASS 1 OBESITY WITH BODY MASS INDEX (BMI) OF 30.0 TO 30.9 IN ADULT: Status: ACTIVE | Noted: 2023-12-05

## 2025-02-25 LAB
ATRIAL RATE: 119 BPM
P AXIS: 59 DEGREES
P OFFSET: 218 MS
P ONSET: 151 MS
PR INTERVAL: 140 MS
Q ONSET: 221 MS
QRS COUNT: 20 BEATS
QRS DURATION: 82 MS
QT INTERVAL: 312 MS
QTC CALCULATION(BAZETT): 438 MS
QTC FREDERICIA: 392 MS
R AXIS: 44 DEGREES
T AXIS: 78 DEGREES
T OFFSET: 377 MS
VENTRICULAR RATE: 119 BPM

## 2025-02-27 ENCOUNTER — HOSPITAL ENCOUNTER (OUTPATIENT)
Dept: CARDIOLOGY | Facility: CLINIC | Age: 65
Discharge: HOME | End: 2025-02-27
Payer: COMMERCIAL

## 2025-02-27 ENCOUNTER — OFFICE VISIT (OUTPATIENT)
Dept: CARDIOLOGY | Facility: CLINIC | Age: 65
End: 2025-02-27
Payer: COMMERCIAL

## 2025-02-27 VITALS
BODY MASS INDEX: 31.13 KG/M2 | SYSTOLIC BLOOD PRESSURE: 140 MMHG | HEIGHT: 74 IN | OXYGEN SATURATION: 98 % | DIASTOLIC BLOOD PRESSURE: 82 MMHG | HEART RATE: 65 BPM | WEIGHT: 242.6 LBS

## 2025-02-27 DIAGNOSIS — I82.431 ACUTE DEEP VEIN THROMBOSIS (DVT) OF POPLITEAL VEIN OF RIGHT LOWER EXTREMITY (MULTI): Primary | ICD-10-CM

## 2025-02-27 DIAGNOSIS — I26.02 ACUTE SADDLE PULMONARY EMBOLISM WITH ACUTE COR PULMONALE (MULTI): ICD-10-CM

## 2025-02-27 DIAGNOSIS — I27.82 CHRONIC SADDLE PULMONARY EMBOLISM WITHOUT ACUTE COR PULMONALE (MULTI): ICD-10-CM

## 2025-02-27 DIAGNOSIS — I26.92 CHRONIC SADDLE PULMONARY EMBOLISM WITHOUT ACUTE COR PULMONALE (MULTI): ICD-10-CM

## 2025-02-27 DIAGNOSIS — E78.5 DYSLIPIDEMIA: ICD-10-CM

## 2025-02-27 LAB
AORTIC VALVE MEAN GRADIENT: 3 MMHG
AORTIC VALVE PEAK VELOCITY: 1.19 M/S
AV PEAK GRADIENT: 6 MMHG
EJECTION FRACTION APICAL 4 CHAMBER: 58.3
EJECTION FRACTION: 63 %
LEFT ATRIUM VOLUME AREA LENGTH INDEX BSA: 28.2 ML/M2
LEFT VENTRICLE INTERNAL DIMENSION DIASTOLE: 4.43 CM (ref 3.5–6)
LEFT VENTRICULAR OUTFLOW TRACT DIAMETER: 2.11 CM
RIGHT VENTRICLE FREE WALL PEAK S': 0.13 CM/S
RIGHT VENTRICLE PEAK SYSTOLIC PRESSURE: 15.7 MMHG
TRICUSPID ANNULAR PLANE SYSTOLIC EXCURSION: 2.2 CM

## 2025-02-27 PROCEDURE — 93308 TTE F-UP OR LMTD: CPT

## 2025-02-27 PROCEDURE — 99214 OFFICE O/P EST MOD 30 MIN: CPT | Performed by: INTERNAL MEDICINE

## 2025-02-27 PROCEDURE — 3008F BODY MASS INDEX DOCD: CPT | Performed by: INTERNAL MEDICINE

## 2025-02-27 PROCEDURE — 93308 TTE F-UP OR LMTD: CPT | Performed by: STUDENT IN AN ORGANIZED HEALTH CARE EDUCATION/TRAINING PROGRAM

## 2025-02-27 RX ORDER — CETIRIZINE HYDROCHLORIDE 10 MG/1
10 TABLET ORAL DAILY
COMMUNITY

## 2025-02-27 NOTE — PROGRESS NOTES
"PCP: Jaime Vazquez MD    Subjective   Jaime Johnson is a 64 y.o. male who is here for follow up of  PE/DVT .  Patient had second episode of VTE recently, status post EKOS placement with IR.  Doing well from a PE perspective.    Review of Systems:  Otherwise, limited cardiovascular review of systems is negative.    Past Medical History:  He has a past medical history of Other specified health status.    Surgical History:   He has a past surgical history that includes Small intestine surgery (10/29/2015) and Knee surgery (11/05/2015).    Family History:   No family history on file.No family history on file.    Social History:   Tobacco Use: Medium Risk (2/11/2025)    Received from [facility]    Patient History     Smoking Tobacco Use: Former     Smokeless Tobacco Use: Never     Passive Exposure: Not on file       Outpatient Medications:    Current Outpatient Medications:     apixaban (Eliquis) 5 mg tablet, Take 2 tablets (10 mg) by mouth 2 times a day for 6 days, THEN 1 tablet (5 mg) 2 times a day., Disp: 84 tablet, Rfl: 1    fluticasone (Flonase) 50 mcg/actuation nasal spray, Administer 1 spray into each nostril 2 times a day. Shake gently. Before first use, prime pump. After use, clean tip and replace cap., Disp: , Rfl:     cetirizine (ZyrTEC) 10 mg tablet, Take 1 tablet (10 mg) by mouth once daily., Disp: , Rfl:      Allergies:  Patient has no known allergies.       Objective   Vital Signs:  /82 (BP Location: Left arm, Patient Position: Sitting, BP Cuff Size: Adult)   Pulse 65   Ht 1.88 m (6' 2\")   Wt 110 kg (242 lb 9.6 oz)   SpO2 98%   BMI 31.15 kg/m²     Physical Exam:  General: no acute distress  HEENT: EOMI, no scleral icterus.  Lungs: Clear to auscultation bilaterally without wheezing, rales, or rhonchi.  Cardiovascular: Regular rhythm and rate. Normal S1 and S2. No murmurs, rubs, or gallops are appreciated. JVP normal.  no bruits  Abdomen: Soft, nontender, nondistended. Bowel sounds " present.  Extremities: Trace to 1+ right lower extremity edema, warm to touch.  Neurologic: Alert and oriented x3.    Pertinent Recent Cardiovascular Studies (personally reviewed):  Cardiac studies:  Echocardiogram 2/27/2025:  RV has nicely recovered with normal function and size.  LVEF is 60 to 65%.    Laboratory values:  CMP:  Recent Labs     01/31/25  0442 01/30/25  0808 01/30/25  0518 01/29/25 2035 01/29/25  0527 01/28/25  1110 11/30/22  1200    137 139 139 140 140 138   K 4.1 3.4* 3.5 3.7 3.7 3.7 4.3    105 106 105 107 105 101   CO2 24 23 23 23 21 24 25   ANIONGAP 11 12 14 15 16 15 16   BUN 22 22 21 21 18 16 18   CREATININE 1.08 1.09 0.95 1.04 1.07 1.15 1.04   EGFR 77 76 89 80 77 71  --    MG  --   --   --   --   --  1.93 2.07     Recent Labs     01/28/25  1110 11/30/22  1200   ALBUMIN 4.3 3.9   ALKPHOS 95 86   ALT 17 44   AST 17 30   BILITOT 0.7 1.1     CBC:  Recent Labs     01/31/25  0442 01/31/25  0042 01/30/25  1807 01/30/25  1212 01/30/25  0518 01/30/25  0024 01/29/25 2035 01/29/25  1427   WBC 8.7 10.4 9.2 9.3 9.0 11.3 10.4 10.7   HGB 14.5 14.2 15.1 15.3 14.6 16.3 16.1 16.0   HCT 43.7 42.7 46.0 45.3 43.8 48.2 47.8 46.9    157 173 178 149* 187 165 173   MCV 95 96 97 94 96 95 95 94     COAG:   Recent Labs     01/31/25  0442 01/31/25  0042 01/30/25  1807 01/30/25  1212 01/30/25  0518 01/30/25  0024 01/29/25 2035 01/29/25  1427 01/29/25  0527 01/29/25  0201 01/28/25 2031 01/28/25  1725 01/28/25  1213 12/02/22  0519 12/01/22  0553   INR 1.6* 1.7* 1.5* 1.2* 1.3* 1.2* 1.3* 1.2*  --   --   --   --    < >  --   --    HAUF  --   --   --   --  0.6 0.6  --   --  0.7 0.5 0.4 1.6*  --  0.4 0.5   FIBRINOGEN 538* 513* 566* 525* 501* 566* 581* 581*  --   --   --   --    < >  --   --     < > = values in this interval not displayed.     CARDIAC:   Recent Labs     01/29/25  1153 01/28/25  1725 01/28/25  1110 12/15/22  1232 11/30/22  1614 11/30/22  1200   TROPHS 277* 1,048* 569*  654* 12 329* 389*   BNP   --   --  41  --   --  126*        I have personally reviewed most recent PCP, cardiology, vascular, and/or podiatry documentation.      Assessment/Plan   64 y.o. male with  recurrent VTE .  First episode was 2022 in the setting of surgery.  Current episode is in the setting of sinusitis.  Notably, his daughter lives in [place] and he does take long flights to and from without significant ambulation.    Plan:  Continue the Eliquis at this time.  We will obtain a DVT scan in 6 months after event (right lower extremity, will obtain in July).  If the DVT is resolved at that time, we will plan to titrate to half dose DOAC for prophylaxis indefinitely.    Education was performed regarding ambulation to prevent DVT.  Age appropriate cancer screening is important.   FLP should be obtained prior to next visit.  BP is elevated above goal of <130/80 - defer to PCP if persistently elevated, will need tx.         Zach Lopez MD, FACC, FSCAI, RPVI  Co-Director, Vascular Center, and  Co-Director, Pulmonary Embolism Response Team,  [facility] [place]                            of Medicine,                                                                [facility] [employer]

## 2025-02-27 NOTE — LETTER
February 28, 2025     Jaime Vazquez MD  [address]  [place] [address]    Patient: Jaime Johnson   YOB: 1960   Date of Visit: 2/27/2025       Dear Dr. Jaime Vazquez MD:    Thank you for referring Jaime Johnson to me for evaluation. Below are my notes for this consultation.  If you have questions, please do not hesitate to call me. I look forward to following your patient along with you.       Sincerely,     Zach Lopez MD      CC: No Recipients  ______________________________________________________________________________________    PCP: Jaime Vazquez MD    Subjective  Jaime Johnson is a 64 y.o. male who is here for follow up of  PE/DVT .  Patient had second episode of VTE recently, status post EKOS placement with IR.  Doing well from a PE perspective.    Review of Systems:  Otherwise, limited cardiovascular review of systems is negative.    Past Medical History:  He has a past medical history of Other specified health status.    Surgical History:   He has a past surgical history that includes Small intestine surgery (10/29/2015) and Knee surgery (11/05/2015).    Family History:   No family history on file.No family history on file.    Social History:   Tobacco Use: Medium Risk (2/11/2025)    Received from [facility]    Patient History    • Smoking Tobacco Use: Former    • Smokeless Tobacco Use: Never    • Passive Exposure: Not on file       Outpatient Medications:    Current Outpatient Medications:   •  apixaban (Eliquis) 5 mg tablet, Take 2 tablets (10 mg) by mouth 2 times a day for 6 days, THEN 1 tablet (5 mg) 2 times a day., Disp: 84 tablet, Rfl: 1  •  fluticasone (Flonase) 50 mcg/actuation nasal spray, Administer 1 spray into each nostril 2 times a day. Shake gently. Before first use, prime pump. After use, clean tip and replace cap., Disp: , Rfl:   •  cetirizine (ZyrTEC) 10 mg tablet, Take 1 tablet (10 mg) by mouth once daily., Disp: , Rfl:      Allergies:  Patient has no known  "allergies.       Objective  Vital Signs:  /82 (BP Location: Left arm, Patient Position: Sitting, BP Cuff Size: Adult)   Pulse 65   Ht 1.88 m (6' 2\")   Wt 110 kg (242 lb 9.6 oz)   SpO2 98%   BMI 31.15 kg/m²     Physical Exam:  General: no acute distress  HEENT: EOMI, no scleral icterus.  Lungs: Clear to auscultation bilaterally without wheezing, rales, or rhonchi.  Cardiovascular: Regular rhythm and rate. Normal S1 and S2. No murmurs, rubs, or gallops are appreciated. JVP normal.  no bruits  Abdomen: Soft, nontender, nondistended. Bowel sounds present.  Extremities: Trace to 1+ right lower extremity edema, warm to touch.  Neurologic: Alert and oriented x3.    Pertinent Recent Cardiovascular Studies (personally reviewed):  Cardiac studies:  Echocardiogram 2/27/2025:  RV has nicely recovered with normal function and size.  LVEF is 60 to 65%.    Laboratory values:  CMP:  Recent Labs     01/31/25  0442 01/30/25  0808 01/30/25  0518 01/29/25 2035 01/29/25  0527 01/28/25  1110 11/30/22  1200    137 139 139 140 140 138   K 4.1 3.4* 3.5 3.7 3.7 3.7 4.3    105 106 105 107 105 101   CO2 24 23 23 23 21 24 25   ANIONGAP 11 12 14 15 16 15 16   BUN 22 22 21 21 18 16 18   CREATININE 1.08 1.09 0.95 1.04 1.07 1.15 1.04   EGFR 77 76 89 80 77 71  --    MG  --   --   --   --   --  1.93 2.07     Recent Labs     01/28/25  1110 11/30/22  1200   ALBUMIN 4.3 3.9   ALKPHOS 95 86   ALT 17 44   AST 17 30   BILITOT 0.7 1.1     CBC:  Recent Labs     01/31/25  0442 01/31/25  0042 01/30/25  1807 01/30/25  1212 01/30/25  0518 01/30/25  0024 01/29/25 2035 01/29/25  1427   WBC 8.7 10.4 9.2 9.3 9.0 11.3 10.4 10.7   HGB 14.5 14.2 15.1 15.3 14.6 16.3 16.1 16.0   HCT 43.7 42.7 46.0 45.3 43.8 48.2 47.8 46.9    157 173 178 149* 187 165 173   MCV 95 96 97 94 96 95 95 94     COAG:   Recent Labs     01/31/25  0442 01/31/25  0042 01/30/25  1807 01/30/25  1212 01/30/25  0518 01/30/25  0024 01/29/25  2035 01/29/25  1427 " 01/29/25  0527 01/29/25  0201 01/28/25  2031 01/28/25  1725 01/28/25  1213 12/02/22  0519 12/01/22  0553   INR 1.6* 1.7* 1.5* 1.2* 1.3* 1.2* 1.3* 1.2*  --   --   --   --    < >  --   --    HAUF  --   --   --   --  0.6 0.6  --   --  0.7 0.5 0.4 1.6*  --  0.4 0.5   FIBRINOGEN 538* 513* 566* 525* 501* 566* 581* 581*  --   --   --   --    < >  --   --     < > = values in this interval not displayed.     CARDIAC:   Recent Labs     01/29/25  1153 01/28/25  1725 01/28/25  1110 12/15/22  1232 11/30/22  1614 11/30/22  1200   TROPHS 277* 1,048* 569*  654* 12 329* 389*   BNP  --   --  41  --   --  126*        I have personally reviewed most recent PCP, cardiology, vascular, and/or podiatry documentation.      Assessment/Plan  64 y.o. male with  recurrent VTE .  First episode was 2022 in the setting of surgery.  Current episode is in the setting of sinusitis.  Notably, his daughter lives in [place] and he does take long flights to and from without significant ambulation.    Plan:  Continue the Eliquis at this time.  We will obtain a DVT scan in 6 months after event (right lower extremity, will obtain in July).  If the DVT is resolved at that time, we will plan to titrate to half dose DOAC for prophylaxis indefinitely.    Education was performed regarding ambulation to prevent DVT.  Age appropriate cancer screening is important.   FLP should be obtained prior to next visit.  BP is elevated above goal of <130/80 - defer to PCP if persistently elevated, will need tx.         Zach Lopez MD, FACC, FSCAI, RPVI  Co-Director, Vascular Center, and  Co-Director, Pulmonary Embolism Response Team,  [facility] [place]                            of Medicine,                                                                [facility] [employer]

## 2025-03-06 ENCOUNTER — OFFICE VISIT (OUTPATIENT)
Dept: HEMATOLOGY/ONCOLOGY | Facility: CLINIC | Age: 65
End: 2025-03-06
Payer: COMMERCIAL

## 2025-03-06 VITALS
SYSTOLIC BLOOD PRESSURE: 148 MMHG | RESPIRATION RATE: 20 BRPM | TEMPERATURE: 97.9 F | WEIGHT: 242.51 LBS | OXYGEN SATURATION: 99 % | BODY MASS INDEX: 31.14 KG/M2 | HEART RATE: 67 BPM | DIASTOLIC BLOOD PRESSURE: 90 MMHG

## 2025-03-06 DIAGNOSIS — I82.409 DEEP VEIN THROMBOSIS (DVT) OF LOWER EXTREMITY, UNSPECIFIED CHRONICITY, UNSPECIFIED LATERALITY, UNSPECIFIED VEIN (MULTI): Primary | ICD-10-CM

## 2025-03-06 PROCEDURE — 99215 OFFICE O/P EST HI 40 MIN: CPT | Performed by: INTERNAL MEDICINE

## 2025-03-06 PROCEDURE — 99205 OFFICE O/P NEW HI 60 MIN: CPT | Performed by: INTERNAL MEDICINE

## 2025-03-06 ASSESSMENT — ENCOUNTER SYMPTOMS
LOSS OF SENSATION IN FEET: 0
DEPRESSION: 0
OCCASIONAL FEELINGS OF UNSTEADINESS: 0

## 2025-03-06 ASSESSMENT — PATIENT HEALTH QUESTIONNAIRE - PHQ9
1. LITTLE INTEREST OR PLEASURE IN DOING THINGS: NOT AT ALL
SUM OF ALL RESPONSES TO PHQ9 QUESTIONS 1 AND 2: 0
2. FEELING DOWN, DEPRESSED OR HOPELESS: NOT AT ALL

## 2025-03-06 ASSESSMENT — COLUMBIA-SUICIDE SEVERITY RATING SCALE - C-SSRS
6. HAVE YOU EVER DONE ANYTHING, STARTED TO DO ANYTHING, OR PREPARED TO DO ANYTHING TO END YOUR LIFE?: NO
2. HAVE YOU ACTUALLY HAD ANY THOUGHTS OF KILLING YOURSELF?: NO

## 2025-03-06 ASSESSMENT — PAIN SCALES - GENERAL: PAINLEVEL_OUTOF10: 0-NO PAIN

## 2025-03-06 NOTE — PROGRESS NOTES
Patient ID: : Jaime Johnson            Primary Care Provider: Jaime Vazquez MD    LOCATION:  [facility]    REFERRING PHYSICIAN:  Dr. Jaime Vazquez MD    HEMATOLOGY ONCOLOGY PROBLEMS:  1.  Recurrent DVT and pulmonary embolism.    CHIEF COMPLAINTS:  Patient is in the clinic today for evaluation of recurrent DVT and pulmonary embolism.    HISTORY:  Jaime Johnson is a 64 y.o. male with recurrent DVT and pulmonary embolisms.  He was initially diagnosed with bilateral DVT and bilateral PE in Nov 2022, 1 week after his hernia surgery.  He was maintained on Eliquis for 1 year.  He never had any problem with thromboembolic complication previously even after multiple surgeries.  Family history is unremarkable.  He did well for few years but was evaluated at [facility] ER in January 2025 with complaint of acute shortness of breath.  Workup revealed extensive right leg DVT and acute bilateral pulmonary embolism with moderate to large clot burden with right heart strain.  He ended up EKOS placement with interventional radiology with significant improvement in his symptomatology.  There were no recent surgeries or procedures but apparently patient was flying frequently to [place] with long flights to meet his daughter.    INTERVAL HISTORY:  Patient denies any new complaints.  He is tolerating Eliquis very well other than some concern regarding high blood pressures since being on Eliquis.  Most of his cardiopulmonary symptoms have resolved.  No history of nausea, vomiting, fever, night sweats, diarrhea, rash, anorexia or weight loss. No recent changes in medications.    PAST MEDICAL HISTORY:  1.  History of recurrent DVT/PE.  2.  Nasal allergies  3.  History of left knee surgery at age 20, d right knee surgery in 2010, umbilical hernia surgery (2022) and intestinal surgery in 2016.    SOCIAL HISTORY:   for 31 years and lives in [place].  Non-smoker and nonalcoholic.  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  Born and raised in [place].    FAMILY HISTORY:  Father  at age 64 from pneumonia and myocardial infarction related complication.  Mother  from natural causes at age 91.  3 siblings and one 27-year-old daughter ~ all alive and well.  No other family history of bleeding, clotting or malignant disorders in the family history.    REVIEW OF SYSTEMS:   Pertinent finding as per the history above.  All other systems have been reviewed and generally negative and noncontributory.    VITAL SIGNS  BSA: 2.4 meters squared  /90   Pulse 67   Temp 36.6 °C (97.9 °F)   Resp 20   Wt 110 kg (242 lb 8.1 oz)   SpO2 99%   BMI 31.14 kg/m²     PHYSICAL EXAMINATION:  Detailed physical examination not done.    LAB DATA:  CBC and metabolic profile was unremarkable on 2025.    RADIOLOGICAL DATA:  Bilateral lower extremity Doppler ultrasound 2025   CONCLUSIONS:  Right Lower Venous: There is acute occlusive deep vein thrombosis visualized in the popliteal vein. The remainder of the right leg is negative for deep vein thrombosis.  Left Lower Venous: No evidence of acute deep vein thrombus visualized in the left lower extremity.    CT angiogram 2025  IMPRESSION:  Acute PE with moderate-to-large clot burden as described with suggestion of right heart strain.    ASSESSMENT / PLAN:  1.  Recurrent DVT and pulmonary embolism.  Please refer the details of initial presentation and management as outlined above.  In summary,  he was initially diagnosed with bilateral DVT and bilateral PE in 2022, 1 week after his hernia surgery.  He was maintained on Eliquis for 1 year.  He never had any problem with thromboembolic complication previously even after multiple surgeries.  Family history is unremarkable.  He did well for few years but was evaluated at [facility] ER in 2025 with complaint of acute shortness of breath.  Workup revealed extensive right leg DVT and acute  bilateral pulmonary embolism with moderate to large clot burden with right heart strain.  He ended up EKOS placement with interventional radiology with significant improvement in his symptomatology.  There were no recent surgeries or procedures but apparently patient was flying frequently to [place] with long flights to meet his daughter.    Long discussion with the patient and his wife and they were explained that for now he has to be maintained on anticoagulation indefinitely.  He is already scheduled for follow-up Doppler ultrasound in July and will follow-up on those results.  If there is improvement in clot burden then we will consider holding the Eliquis for few days to check complete hypercoagulable workup.  Once the labs are drawn he will go back on indefinite anticoagulation as before.  Patient is explained that he had a recent acute thromboembolic episode and is already maintained on anticoagulation and as such this is not the right time to do the hypercoagulable testing.  His previous personal or family history is unremarkable and there seems to be some precipitating factors reported both the events but it is still important to rule out any baseline hypercoagulable condition.    2.  Follow-up.  He will return to the clinic in first week of August immediately after his follow-up Doppler ultrasound.    A total of 60 minutes were spent in evaluation - performing physical examination, history taking, review of the previous extensive records/information and formulating current and future management plan. Majority of the time was spend in consultation and discussion.    This note has been transcribed using Dragon voice recognition system and there is a possibility of unintentional typing misprints.

## 2025-03-10 DIAGNOSIS — E78.5 DYSLIPIDEMIA: ICD-10-CM

## 2025-04-13 LAB
CHOLEST SERPL-MCNC: 195 MG/DL
CHOLEST/HDLC SERPL: 4.4 (CALC)
HDLC SERPL-MCNC: 44 MG/DL
LDLC SERPL CALC-MCNC: 132 MG/DL (CALC)
NONHDLC SERPL-MCNC: 151 MG/DL (CALC)
TRIGL SERPL-MCNC: 86 MG/DL

## 2025-06-30 ENCOUNTER — TELEPHONE (OUTPATIENT)
Dept: CARDIOLOGY | Facility: CLINIC | Age: 65
End: 2025-06-30
Payer: COMMERCIAL

## 2025-06-30 DIAGNOSIS — E78.5 DYSLIPIDEMIA: ICD-10-CM

## 2025-06-30 PROBLEM — I26.99 PULMONARY EMBOLISM: Status: RESOLVED | Noted: 2023-12-05 | Resolved: 2025-06-30

## 2025-06-30 NOTE — TELEPHONE ENCOUNTER
Patient called again wondering if he is able to have the test done on both legs on 7/17. He can be reached at :  [phone]

## 2025-06-30 NOTE — TELEPHONE ENCOUNTER
Pt has an ultrasound scheduled for 7/17 on his right leg with ov to follow. For the last week he has been having edema in his left leg as well and wants to know if we can adjust his testing to do both legs. He does not want to change the date of the appointment.

## 2025-07-02 NOTE — TELEPHONE ENCOUNTER
Pt has an Venous Duplex for the Right leg on 7/17. L leg is swelling.  Can the scan be done for both legs??

## 2025-07-03 DIAGNOSIS — I27.82 CHRONIC SADDLE PULMONARY EMBOLISM WITHOUT ACUTE COR PULMONALE (MULTI): ICD-10-CM

## 2025-07-03 DIAGNOSIS — I26.92 CHRONIC SADDLE PULMONARY EMBOLISM WITHOUT ACUTE COR PULMONALE (MULTI): ICD-10-CM

## 2025-07-03 DIAGNOSIS — M79.605 PAIN IN LEFT LEG: ICD-10-CM

## 2025-07-03 DIAGNOSIS — M79.604 PAIN IN RIGHT LEG: ICD-10-CM

## 2025-07-03 DIAGNOSIS — R60.9 EDEMA, UNSPECIFIED TYPE: ICD-10-CM

## 2025-07-03 NOTE — TELEPHONE ENCOUNTER
I spoke to Dr Lopez she agreed to have the order changed for Bilateral scan.  Updated the lavinia with new order. 7/17/25 @ 7:30am for scan and see Dr Lopez after test same-day.  Called patient with update

## 2025-07-17 ENCOUNTER — OFFICE VISIT (OUTPATIENT)
Dept: CARDIOLOGY | Facility: CLINIC | Age: 65
End: 2025-07-17
Payer: MEDICARE

## 2025-07-17 ENCOUNTER — HOSPITAL ENCOUNTER (OUTPATIENT)
Dept: VASCULAR MEDICINE | Facility: CLINIC | Age: 65
Discharge: HOME | End: 2025-07-17
Payer: MEDICARE

## 2025-07-17 ENCOUNTER — APPOINTMENT (OUTPATIENT)
Dept: VASCULAR MEDICINE | Facility: CLINIC | Age: 65
End: 2025-07-17
Payer: MEDICARE

## 2025-07-17 VITALS
HEART RATE: 65 BPM | OXYGEN SATURATION: 95 % | SYSTOLIC BLOOD PRESSURE: 132 MMHG | BODY MASS INDEX: 30.98 KG/M2 | HEIGHT: 74 IN | WEIGHT: 241.4 LBS | DIASTOLIC BLOOD PRESSURE: 80 MMHG

## 2025-07-17 DIAGNOSIS — I82.431 ACUTE DEEP VEIN THROMBOSIS (DVT) OF POPLITEAL VEIN OF RIGHT LOWER EXTREMITY: ICD-10-CM

## 2025-07-17 DIAGNOSIS — I82.431 ACUTE EMBOLISM AND THROMBOSIS OF RIGHT POPLITEAL VEIN (MULTI): ICD-10-CM

## 2025-07-17 DIAGNOSIS — M79.604 PAIN IN RIGHT LEG: ICD-10-CM

## 2025-07-17 DIAGNOSIS — R60.9 EDEMA, UNSPECIFIED TYPE: ICD-10-CM

## 2025-07-17 DIAGNOSIS — I27.82 CHRONIC SADDLE PULMONARY EMBOLISM WITHOUT ACUTE COR PULMONALE (MULTI): Primary | ICD-10-CM

## 2025-07-17 DIAGNOSIS — M79.605 PAIN IN LEFT LEG: ICD-10-CM

## 2025-07-17 DIAGNOSIS — I26.92 CHRONIC SADDLE PULMONARY EMBOLISM WITHOUT ACUTE COR PULMONALE (MULTI): Primary | ICD-10-CM

## 2025-07-17 DIAGNOSIS — I77.810 THORACIC AORTIC ECTASIA: ICD-10-CM

## 2025-07-17 DIAGNOSIS — E78.5 DYSLIPIDEMIA: ICD-10-CM

## 2025-07-17 DIAGNOSIS — E87.70 HYPERVOLEMIA, UNSPECIFIED HYPERVOLEMIA TYPE: ICD-10-CM

## 2025-07-17 PROCEDURE — 1159F MED LIST DOCD IN RCRD: CPT | Performed by: INTERNAL MEDICINE

## 2025-07-17 PROCEDURE — 93970 EXTREMITY STUDY: CPT | Performed by: INTERNAL MEDICINE

## 2025-07-17 PROCEDURE — 99215 OFFICE O/P EST HI 40 MIN: CPT | Performed by: INTERNAL MEDICINE

## 2025-07-17 PROCEDURE — 99212 OFFICE O/P EST SF 10 MIN: CPT | Mod: 25 | Performed by: MARRIAGE & FAMILY THERAPIST

## 2025-07-17 PROCEDURE — 3008F BODY MASS INDEX DOCD: CPT | Performed by: INTERNAL MEDICINE

## 2025-07-17 PROCEDURE — 93970 EXTREMITY STUDY: CPT

## 2025-07-17 PROCEDURE — G2211 COMPLEX E/M VISIT ADD ON: HCPCS | Performed by: INTERNAL MEDICINE

## 2025-07-17 RX ORDER — ERGOCALCIFEROL 1.25 MG/1
1.25 CAPSULE ORAL
COMMUNITY

## 2025-07-17 RX ORDER — ROSUVASTATIN CALCIUM 10 MG/1
10 TABLET, COATED ORAL DAILY
Qty: 90 TABLET | Refills: 3 | Status: SHIPPED | OUTPATIENT
Start: 2025-07-17 | End: 2026-07-17

## 2025-07-17 RX ORDER — FUROSEMIDE 40 MG/1
40 TABLET ORAL DAILY PRN
Qty: 30 TABLET | Refills: 11 | Status: SHIPPED | OUTPATIENT
Start: 2025-07-17 | End: 2026-07-17

## 2025-07-17 NOTE — LETTER
"July 20, 2025     Jaime Vazquez MD  [address]  [facility] [address]    Patient: Leopoldo Johnson   YOB: 1960   Date of Visit: 7/17/2025       Dear Dr. Jaime Vazquez MD:    Thank you for referring Leopoldo Johnson to me for evaluation. Below are my notes for this consultation.  If you have questions, please do not hesitate to call me. I look forward to following your patient along with you.       Sincerely,     Zach Lopez MD      CC: No Recipients  ______________________________________________________________________________________    PCP: Jaime Vazquez MD      Subjective  History of Present Illness  Jaime Johnson \"Misha" is a 65 year old male with a history of pulmonary embolism and deep vein thrombosis who presents for follow-up of DVT and new left ankle swelling.    He has experienced two episodes of deep vein thrombosis and pulmonary embolism, with the most recent in January 2025, requiring EKOS placement with IR colleagues. A DVT scan in February 2025 showed a thrombus in the right lower extremity. A repeat DVT scan was performed today.    New swelling in the left ankle and top of the foot has been present for two to three weeks, more pronounced on the left side. There is no recent injury to the ankle. Both legs were scanned today, with no significant findings on the left side.    He experiences shortness of breath, particularly after showering in the morning, coinciding with the onset of the ankle swelling. He is currently taking Eliquis. He frequently takes long flights to visit his daughter in [place]. There is a trip planned in coming weeks.    Review of Systems:  Otherwise, limited cardiovascular review of systems is negative.    Past Medical History:  He has a past medical history of Other specified health status.    Surgical History:   He has a past surgical history that includes Small intestine surgery (10/29/2015) and Knee surgery (11/05/2015).    Family History:   Family History[1]Family " "History[2]    Social History:   Tobacco Use: Medium Risk (4/24/2025)    Received from [facility]    Patient History    • Smoking Tobacco Use: Former    • Smokeless Tobacco Use: Never    • Passive Exposure: Not on file       Outpatient Medications:  Current Medications[3]     Allergies:  Patient has no known allergies.       Objective  Vital Signs:  /80 (BP Location: Left arm, Patient Position: Sitting, BP Cuff Size: Adult)   Pulse 65   Ht 1.88 m (6' 2\")   Wt 109 kg (241 lb 6.4 oz)   SpO2 95%   BMI 30.99 kg/m²     Physical Exam:  General: no acute distress  HEENT: EOMI  Lungs: Good air movement bilaterally with no wheezing/crackles/rhonchi  Cardiovascular: Regular rhythm without murmurs/rubs/gallops; JVP is normal  No carotid bruits present  Abdomen: Soft  Extremities: 1+ distal pulses BLE and 1+ edema BLE.  Neurologic: Alert and oriented x3.    Pertinent Recent Cardiovascular Studies (personally reviewed):  Results  LABS  LDL: 132 (04/2025)    RADIOLOGY  DVT scan: Thrombus resolved in the popliteal vein with chronic changes present (07/17/2025)    DIAGNOSTIC  Echocardiogram 2/27/25: Normal cardiac function, mild diastolic dysfunction, aorta measuring 3.8 cm    Laboratory values:  CMP:  Recent Labs     01/31/25  0442 01/30/25  0808 01/30/25  0518 01/29/25  2035 01/29/25  0527 01/28/25  1110 11/30/22  1200    137 139 139 140 140 138   K 4.1 3.4* 3.5 3.7 3.7 3.7 4.3    105 106 105 107 105 101   CO2 24 23 23 23 21 24 25   ANIONGAP 11 12 14 15 16 15 16   BUN 22 22 21 21 18 16 18   CREATININE 1.08 1.09 0.95 1.04 1.07 1.15 1.04   EGFR 77 76 89 80 77 71  --    MG  --   --   --   --   --  1.93 2.07     Recent Labs     01/28/25  1110 11/30/22  1200   ALBUMIN 4.3 3.9   ALKPHOS 95 86   ALT 17 44   AST 17 30   BILITOT 0.7 1.1     CBC:  Recent Labs     01/31/25  0442 01/31/25  0042 01/30/25  1807 01/30/25  1212 01/30/25  0518 01/30/25  0024 01/29/25 2035 01/29/25  1427   WBC 8.7 10.4 9.2 9.3 9.0 11.3 " "10.4 10.7   HGB 14.5 14.2 15.1 15.3 14.6 16.3 16.1 16.0   HCT 43.7 42.7 46.0 45.3 43.8 48.2 47.8 46.9    157 173 178 149* 187 165 173   MCV 95 96 97 94 96 95 95 94     COAG:   Recent Labs     01/31/25  0442 01/31/25  0042 01/30/25  1807 01/30/25  1212 01/30/25  0518 01/30/25  0024 01/29/25 2035 01/29/25  1427 01/29/25  0527 01/29/25  0201 01/28/25  2031 01/28/25  1725 01/28/25  1213 12/02/22  0519 12/01/22  0553   INR 1.6* 1.7* 1.5* 1.2* 1.3* 1.2* 1.3* 1.2*  --   --   --   --    < >  --   --    HAUF  --   --   --   --  0.6 0.6  --   --  0.7 0.5 0.4 1.6*  --  0.4 0.5   FIBRINOGEN 538* 513* 566* 525* 501* 566* 581* 581*  --   --   --   --    < >  --   --     < > = values in this interval not displayed.     ABO: No results for input(s): \"ABO\" in the last 18017 hours.  HEME/ENDO:  Recent Labs     04/24/25  0833   HGBA1C 5.9*      CARDIAC:   Recent Labs     01/29/25  1153 01/28/25  1725 01/28/25  1110 12/15/22  1232 11/30/22  1614 11/30/22  1200   TROPHS 277* 1,048* 569*  654* 12 329* 389*   BNP  --   --  41  --   --  126*     Recent Labs     04/12/25  0918   CHOL 195   HDL 44   TRIG 86     Lab Results   Component Value Date    LDLCALC 132 (H) 04/12/2025       I have personally reviewed most recent PCP, cardiology, vascular, and/or podiatry documentation.      Assessment/Plan  Assessment & Plan  Recurrent Venous Thromboembolism (VTE)  Two VTE episodes, resolved thrombus in right popliteal vein with chronic changes.  - Decrease Apixaban to 2.5 mg twice daily. Lifelong prophylactic dosing.  - Finish current 5 mg tablets before switching.  - Consider 90-day supply for cost efficiency.    Left ankle swelling and shortness of breath  New left ankle swelling and shortness of breath likely due to fluid retention.  - Initiate Lasix 40 mg once daily as needed. Will do continuous dose x 3 days over weekend.  - Monitor weight daily.  - Avoid Lasix on travel days.  - Follow up in 2 weeks.    Thoracic ascending aortic " ectasia  Aortic enlargement at 3.8 cm, below surgical threshold.  - Maintain blood pressure and heart rate control for impulse control.    Hyperlipidemia  LDL cholesterol elevated at 132 mg/dL, discussed statin therapy.  - Initiate Rosuvastatin 10 mg daily (ordered).   - FLP at 3 months after initiation.  - Monitor for muscle aches.  - Encourage dietary modifications.    Follow up with Lore in August 2025 to re-eval LLE edema; he can see me in clinic in 1 year with repeat echocardiogram to track thoracic aortic ectasia.    As stated above, I have personally reviewed and interpreted the following labs:   - initiate statin  Hgb A1C 5.9 - dietary modifications; monitor (borderline)  Cr 1.08 during inpt stay - should do well with furosemide as he is loop diuretic naive and with normal Cr    As stated above, I have personally reviewed and interpreted the following vascular studies:  DVT scan 7/17/25 - chronic changes R popliteal v; no DVT LLE       This medical note was created with the assistance of artificial intelligence (AI) for documentation purposes. The content has been reviewed and confirmed by the healthcare provider for accuracy and completeness. Patient consented to the use of audio recording and use of AI during their visit.       Zach Lopez MD, FACC, FSCAI, RPVI  Co-Director, Vascular Center, and  Co-Director, Pulmonary Embolism Response Team,  [facility] [employer]                           Associate Professor of Medicine,                                                                [employer] [facility] [employer]             [1]  No family history on file.  [2]  No family history on file.  [3]    Current Outpatient Medications:   •  cetirizine (ZyrTEC) 10 mg tablet, Take 1 tablet (10 mg) by mouth once daily., Disp: , Rfl:   •  ergocalciferol (Vitamin D-2) 1250 mcg (50,000 units) capsule, Take 1 capsule (1.25 mg) by mouth 1 (one) time per week.,  Disp: , Rfl:   •  fluticasone (Flonase) 50 mcg/actuation nasal spray, Administer 1 spray into each nostril 2 times a day. Shake gently. Before first use, prime pump. After use, clean tip and replace cap., Disp: , Rfl:   •  apixaban (Eliquis) 2.5 mg tablet, Take 1 tablet (2.5 mg) by mouth 2 times a day., Disp: 180 tablet, Rfl: 3  •  furosemide (Lasix) 40 mg tablet, Take 1 tablet (40 mg) by mouth once daily as needed (fluid retention)., Disp: 30 tablet, Rfl: 11  •  rosuvastatin (Crestor) 10 mg tablet, Take 1 tablet (10 mg) by mouth once daily., Disp: 90 tablet, Rfl: 3       [1]  No family history on file.  [2]  No family history on file.  [3]    Current Outpatient Medications:   •  cetirizine (ZyrTEC) 10 mg tablet, Take 1 tablet (10 mg) by mouth once daily., Disp: , Rfl:   •  ergocalciferol (Vitamin D-2) 1250 mcg (50,000 units) capsule, Take 1 capsule (1.25 mg) by mouth 1 (one) time per week., Disp: , Rfl:   •  fluticasone (Flonase) 50 mcg/actuation nasal spray, Administer 1 spray into each nostril 2 times a day. Shake gently. Before first use, prime pump. After use, clean tip and replace cap., Disp: , Rfl:   •  apixaban (Eliquis) 2.5 mg tablet, Take 1 tablet (2.5 mg) by mouth 2 times a day., Disp: 180 tablet, Rfl: 3  •  furosemide (Lasix) 40 mg tablet, Take 1 tablet (40 mg) by mouth once daily as needed (fluid retention)., Disp: 30 tablet, Rfl: 11  •  rosuvastatin (Crestor) 10 mg tablet, Take 1 tablet (10 mg) by mouth once daily., Disp: 90 tablet, Rfl: 3

## 2025-07-17 NOTE — PROGRESS NOTES
"PCP: Jaime Vazquez MD      Subjective   History of Present Illness  Jaime Johnson \"Leopoldo\" is a 65 year old male with a history of pulmonary embolism and deep vein thrombosis who presents for follow-up of DVT and new left ankle swelling.    He has experienced two episodes of deep vein thrombosis and pulmonary embolism, with the most recent in January 2025, requiring EKOS placement with IR colleagues. A DVT scan in February 2025 showed a thrombus in the right lower extremity. A repeat DVT scan was performed today.    New swelling in the left ankle and top of the foot has been present for two to three weeks, more pronounced on the left side. There is no recent injury to the ankle. Both legs were scanned today, with no significant findings on the left side.    He experiences shortness of breath, particularly after showering in the morning, coinciding with the onset of the ankle swelling. He is currently taking Eliquis. He frequently takes long flights to visit his daughter in [place]. There is a trip planned in coming weeks.    Review of Systems:  Otherwise, limited cardiovascular review of systems is negative.    Past Medical History:  He has a past medical history of Other specified health status.    Surgical History:   He has a past surgical history that includes Small intestine surgery (10/29/2015) and Knee surgery (11/05/2015).    Family History:   Family History[1]Family History[2]    Social History:   Tobacco Use: Medium Risk (4/24/2025)    Received from [facility]    Patient History     Smoking Tobacco Use: Former     Smokeless Tobacco Use: Never     Passive Exposure: Not on file       Outpatient Medications:  Current Medications[3]     Allergies:  Patient has no known allergies.       Objective   Vital Signs:  /80 (BP Location: Left arm, Patient Position: Sitting, BP Cuff Size: Adult)   Pulse 65   Ht 1.88 m (6' 2\")   Wt 109 kg (241 lb 6.4 oz)   SpO2 95%   BMI 30.99 kg/m²     Physical " Exam:  General: no acute distress  HEENT: EOMI  Lungs: Good air movement bilaterally with no wheezing/crackles/rhonchi  Cardiovascular: Regular rhythm without murmurs/rubs/gallops; JVP is normal  No carotid bruits present  Abdomen: Soft  Extremities: 1+ distal pulses BLE and 1+ edema BLE.  Neurologic: Alert and oriented x3.    Pertinent Recent Cardiovascular Studies (personally reviewed):  Results  LABS  LDL: 132 (04/2025)    RADIOLOGY  DVT scan: Thrombus resolved in the popliteal vein with chronic changes present (07/17/2025)    DIAGNOSTIC  Echocardiogram 2/27/25: Normal cardiac function, mild diastolic dysfunction, aorta measuring 3.8 cm    Laboratory values:  CMP:  Recent Labs     01/31/25  0442 01/30/25  0808 01/30/25  0518 01/29/25  2035 01/29/25  0527 01/28/25  1110 11/30/22  1200    137 139 139 140 140 138   K 4.1 3.4* 3.5 3.7 3.7 3.7 4.3    105 106 105 107 105 101   CO2 24 23 23 23 21 24 25   ANIONGAP 11 12 14 15 16 15 16   BUN 22 22 21 21 18 16 18   CREATININE 1.08 1.09 0.95 1.04 1.07 1.15 1.04   EGFR 77 76 89 80 77 71  --    MG  --   --   --   --   --  1.93 2.07     Recent Labs     01/28/25  1110 11/30/22  1200   ALBUMIN 4.3 3.9   ALKPHOS 95 86   ALT 17 44   AST 17 30   BILITOT 0.7 1.1     CBC:  Recent Labs     01/31/25  0442 01/31/25  0042 01/30/25  1807 01/30/25  1212 01/30/25  0518 01/30/25  0024 01/29/25  2035 01/29/25  1427   WBC 8.7 10.4 9.2 9.3 9.0 11.3 10.4 10.7   HGB 14.5 14.2 15.1 15.3 14.6 16.3 16.1 16.0   HCT 43.7 42.7 46.0 45.3 43.8 48.2 47.8 46.9    157 173 178 149* 187 165 173   MCV 95 96 97 94 96 95 95 94     COAG:   Recent Labs     01/31/25  0442 01/31/25  0042 01/30/25  1807 01/30/25  1212 01/30/25  0518 01/30/25  0024 01/29/25  2035 01/29/25  1427 01/29/25  0527 01/29/25  0201 01/28/25  2031 01/28/25  1725 01/28/25  1213 12/02/22  0519 12/01/22  0553   INR 1.6* 1.7* 1.5* 1.2* 1.3* 1.2* 1.3* 1.2*  --   --   --   --    < >  --   --    HAUF  --   --   --   --  0.6 0.6  --  "  --  0.7 0.5 0.4 1.6*  --  0.4 0.5   FIBRINOGEN 538* 513* 566* 525* 501* 566* 581* 581*  --   --   --   --    < >  --   --     < > = values in this interval not displayed.     ABO: No results for input(s): \"ABO\" in the last 52476 hours.  HEME/ENDO:  Recent Labs     04/24/25  0833   HGBA1C 5.9*      CARDIAC:   Recent Labs     01/29/25  1153 01/28/25  1725 01/28/25  1110 12/15/22  1232 11/30/22  1614 11/30/22  1200   TROPHS 277* 1,048* 569*  654* 12 329* 389*   BNP  --   --  41  --   --  126*     Recent Labs     04/12/25  0918   CHOL 195   HDL 44   TRIG 86     Lab Results   Component Value Date    LDLCALC 132 (H) 04/12/2025       I have personally reviewed most recent PCP, cardiology, vascular, and/or podiatry documentation.      Assessment/Plan   Assessment & Plan  Recurrent Venous Thromboembolism (VTE)  Two VTE episodes, resolved thrombus in right popliteal vein with chronic changes.  - Decrease Apixaban to 2.5 mg twice daily. Lifelong prophylactic dosing.  - Finish current 5 mg tablets before switching.  - Consider 90-day supply for cost efficiency.    Left ankle swelling and shortness of breath  New left ankle swelling and shortness of breath likely due to fluid retention.  - Initiate Lasix 40 mg once daily as needed. Will do continuous dose x 3 days over weekend.  - Monitor weight daily.  - Avoid Lasix on travel days.  - Follow up in 2 weeks.    Thoracic ascending aortic ectasia  Aortic enlargement at 3.8 cm, below surgical threshold.  - Maintain blood pressure and heart rate control for impulse control.    Hyperlipidemia  LDL cholesterol elevated at 132 mg/dL, discussed statin therapy.  - Initiate Rosuvastatin 10 mg daily (ordered).   - FLP at 3 months after initiation.  - Monitor for muscle aches.  - Encourage dietary modifications.    Follow up with Lore in August 2025 to re-eval LLE edema; he can see me in clinic in 1 year with repeat echocardiogram to track thoracic aortic ectasia.    As stated above, I " have personally reviewed and interpreted the following labs:   - initiate statin  Hgb A1C 5.9 - dietary modifications; monitor (borderline)  Cr 1.08 during inpt stay - should do well with furosemide as he is loop diuretic naive and with normal Cr    As stated above, I have personally reviewed and interpreted the following vascular studies:  DVT scan 7/17/25 - chronic changes R popliteal v; no DVT LLE       This medical note was created with the assistance of artificial intelligence (AI) for documentation purposes. The content has been reviewed and confirmed by the healthcare provider for accuracy and completeness. Patient consented to the use of audio recording and use of AI during their visit.       Zach Lopez MD, FACC, FSCAI, RPVI  Co-Director, Vascular Center, and  Co-Director, Pulmonary Embolism Response Team,  [facility] [place]                           Associate Professor of Medicine,                                                                [employer] [facility] [employer]             [1] No family history on file.  [2] No family history on file.  [3]   Current Outpatient Medications:     cetirizine (ZyrTEC) 10 mg tablet, Take 1 tablet (10 mg) by mouth once daily., Disp: , Rfl:     ergocalciferol (Vitamin D-2) 1250 mcg (50,000 units) capsule, Take 1 capsule (1.25 mg) by mouth 1 (one) time per week., Disp: , Rfl:     fluticasone (Flonase) 50 mcg/actuation nasal spray, Administer 1 spray into each nostril 2 times a day. Shake gently. Before first use, prime pump. After use, clean tip and replace cap., Disp: , Rfl:     apixaban (Eliquis) 2.5 mg tablet, Take 1 tablet (2.5 mg) by mouth 2 times a day., Disp: 180 tablet, Rfl: 3    furosemide (Lasix) 40 mg tablet, Take 1 tablet (40 mg) by mouth once daily as needed (fluid retention)., Disp: 30 tablet, Rfl: 11    rosuvastatin (Crestor) 10 mg tablet, Take 1 tablet (10 mg) by mouth once daily., Disp: 90  tablet, Rfl: 3

## 2025-07-17 NOTE — PATIENT INSTRUCTIONS
VISIT SUMMARY:  Today, we discussed your history of deep vein thrombosis (DVT) and pulmonary embolism, new swelling in your left ankle, and shortness of breath. We also reviewed your aortic aneurysm and hyperlipidemia.    YOUR PLAN:  RECURRENT VENOUS THROMBOEMBOLISM (VTE): You have had two episodes of VTE, with the most recent one in January 2025. Your recent scan showed a resolved thrombus in the right popliteal vein with chronic changes.  -Decrease Apixaban to 2.5 mg twice daily.  -Finish your current 5 mg tablets before switching to the lower dose.  -Consider getting a 90-day supply for cost efficiency.    LEFT ANKLE SWELLING AND SHORTNESS OF BREATH: You have new swelling in your left ankle and shortness of breath, likely due to fluid retention.  -Start taking Lasix 40 mg once daily as needed.  -Monitor your weight daily.  -Avoid taking Lasix on travel days.  -Follow up in 2 weeks.    AORTIC ANEURYSM, UNSPECIFIED SITE: You have an aortic enlargement at 3.8 cm, which is below the surgical threshold.  -Maintain blood pressure and heart rate control.    HYPERLIPIDEMIA: Your LDL cholesterol is elevated at 132 mg/dL.  -Start taking Rosuvastatin 10 mg daily.  -Monitor for muscle aches.  -Make dietary modifications to help lower your cholesterol.

## 2025-08-07 ENCOUNTER — APPOINTMENT (OUTPATIENT)
Dept: HEMATOLOGY/ONCOLOGY | Facility: CLINIC | Age: 65
End: 2025-08-07
Payer: COMMERCIAL

## 2025-08-19 ENCOUNTER — OFFICE VISIT (OUTPATIENT)
Dept: CARDIOLOGY | Facility: CLINIC | Age: 65
End: 2025-08-19
Payer: MEDICARE

## 2025-08-19 VITALS
OXYGEN SATURATION: 95 % | DIASTOLIC BLOOD PRESSURE: 70 MMHG | HEIGHT: 74 IN | SYSTOLIC BLOOD PRESSURE: 122 MMHG | WEIGHT: 250.8 LBS | HEART RATE: 72 BPM | BODY MASS INDEX: 32.19 KG/M2

## 2025-08-19 DIAGNOSIS — I27.82 CHRONIC SADDLE PULMONARY EMBOLISM WITHOUT ACUTE COR PULMONALE (MULTI): ICD-10-CM

## 2025-08-19 DIAGNOSIS — E78.5 DYSLIPIDEMIA: ICD-10-CM

## 2025-08-19 DIAGNOSIS — E87.70 HYPERVOLEMIA, UNSPECIFIED HYPERVOLEMIA TYPE: ICD-10-CM

## 2025-08-19 DIAGNOSIS — I77.810 THORACIC AORTIC ECTASIA: Primary | ICD-10-CM

## 2025-08-19 DIAGNOSIS — I82.431 ACUTE DEEP VEIN THROMBOSIS (DVT) OF POPLITEAL VEIN OF RIGHT LOWER EXTREMITY: ICD-10-CM

## 2025-08-19 DIAGNOSIS — I26.92 CHRONIC SADDLE PULMONARY EMBOLISM WITHOUT ACUTE COR PULMONALE (MULTI): ICD-10-CM

## 2025-08-19 PROCEDURE — 3008F BODY MASS INDEX DOCD: CPT

## 2025-08-19 PROCEDURE — 1036F TOBACCO NON-USER: CPT

## 2025-08-19 PROCEDURE — 99212 OFFICE O/P EST SF 10 MIN: CPT

## 2025-08-19 PROCEDURE — 1160F RVW MEDS BY RX/DR IN RCRD: CPT

## 2025-08-19 PROCEDURE — 1159F MED LIST DOCD IN RCRD: CPT

## 2025-08-19 PROCEDURE — 99215 OFFICE O/P EST HI 40 MIN: CPT
